# Patient Record
Sex: MALE | Race: WHITE | NOT HISPANIC OR LATINO | URBAN - NONMETROPOLITAN AREA
[De-identification: names, ages, dates, MRNs, and addresses within clinical notes are randomized per-mention and may not be internally consistent; named-entity substitution may affect disease eponyms.]

---

## 2023-04-11 ENCOUNTER — OFFICE VISIT (OUTPATIENT)
Dept: PRIMARY CARE | Facility: CLINIC | Age: 53
End: 2023-04-11
Payer: COMMERCIAL

## 2023-04-11 VITALS
HEART RATE: 95 BPM | HEIGHT: 70 IN | DIASTOLIC BLOOD PRESSURE: 86 MMHG | SYSTOLIC BLOOD PRESSURE: 136 MMHG | OXYGEN SATURATION: 97 % | BODY MASS INDEX: 37.25 KG/M2 | WEIGHT: 260.2 LBS

## 2023-04-11 DIAGNOSIS — J01.01 ACUTE RECURRENT MAXILLARY SINUSITIS: Primary | ICD-10-CM

## 2023-04-11 PROBLEM — R73.9 ELEVATED BLOOD SUGAR: Status: ACTIVE | Noted: 2023-04-11

## 2023-04-11 PROBLEM — I71.21 THORACIC ASCENDING AORTIC ANEURYSM (CMS-HCC): Status: ACTIVE | Noted: 2023-04-11

## 2023-04-11 PROBLEM — R06.09 DYSPNEA ON EXERTION: Status: ACTIVE | Noted: 2023-04-11

## 2023-04-11 PROBLEM — R00.0 TACHYCARDIA: Status: ACTIVE | Noted: 2023-04-11

## 2023-04-11 PROBLEM — E66.812 CLASS 2 OBESITY WITH BODY MASS INDEX (BMI) OF 37.0 TO 37.9 IN ADULT: Status: ACTIVE | Noted: 2023-04-11

## 2023-04-11 PROBLEM — M16.11 PRIMARY OSTEOARTHRITIS OF RIGHT HIP: Status: ACTIVE | Noted: 2023-04-11

## 2023-04-11 PROBLEM — I10 BENIGN ESSENTIAL HYPERTENSION: Status: ACTIVE | Noted: 2023-04-11

## 2023-04-11 PROBLEM — I51.89 DIASTOLIC DYSFUNCTION: Status: ACTIVE | Noted: 2023-04-11

## 2023-04-11 PROBLEM — N52.9 ERECTILE DYSFUNCTION: Status: ACTIVE | Noted: 2023-04-11

## 2023-04-11 PROBLEM — M16.12 PRIMARY OSTEOARTHRITIS OF LEFT HIP: Status: ACTIVE | Noted: 2023-04-11

## 2023-04-11 PROBLEM — E66.9 CLASS 2 OBESITY WITH BODY MASS INDEX (BMI) OF 37.0 TO 37.9 IN ADULT: Status: ACTIVE | Noted: 2023-04-11

## 2023-04-11 PROBLEM — R74.8 ELEVATED LIVER ENZYMES: Status: ACTIVE | Noted: 2023-04-11

## 2023-04-11 PROBLEM — R73.03 PREDIABETES: Status: ACTIVE | Noted: 2023-04-11

## 2023-04-11 PROCEDURE — 3079F DIAST BP 80-89 MM HG: CPT | Performed by: FAMILY MEDICINE

## 2023-04-11 PROCEDURE — 3075F SYST BP GE 130 - 139MM HG: CPT | Performed by: FAMILY MEDICINE

## 2023-04-11 PROCEDURE — 1036F TOBACCO NON-USER: CPT | Performed by: FAMILY MEDICINE

## 2023-04-11 PROCEDURE — 99213 OFFICE O/P EST LOW 20 MIN: CPT | Performed by: FAMILY MEDICINE

## 2023-04-11 RX ORDER — AMOXICILLIN AND CLAVULANATE POTASSIUM 875; 125 MG/1; MG/1
875 TABLET, FILM COATED ORAL 2 TIMES DAILY
Qty: 20 TABLET | Refills: 0 | Status: SHIPPED | OUTPATIENT
Start: 2023-04-11 | End: 2023-04-21

## 2023-04-11 RX ORDER — METOPROLOL SUCCINATE 25 MG/1
25 TABLET, EXTENDED RELEASE ORAL DAILY
COMMUNITY
Start: 2022-02-11 | End: 2023-10-30 | Stop reason: SDUPTHER

## 2023-04-11 ASSESSMENT — ENCOUNTER SYMPTOMS: COUGH: 1

## 2023-04-11 NOTE — PATIENT INSTRUCTIONS
TREATMENT FOR SINUSITIS AND UPPER RESPIRATORY INFECTIONS:     Drink plenty of fluids, especially water.     Used humidifiers, steam, hot liquids to moisten your nasal passages.      Saline nasal spray often helps,  Simply Saline is a nice over the counter saline spray that you can use as much as you want.     IF DIRECTED TO DO SO:    You can use Afrin nasal spray for the first 3 days  ONLY , after that, it will make you worse, not better. DO NOT USE IN CHILDREN UNDER 6 YEARS OF AGE OR IF YOU HAVE ANY HYPERTENTION OR HEART ISSUES.      Mucinex or guaifenesin is an over the counter medication that often helps loosen the mucous.  DO NOT USE IN CHILDREN UNDER 6 YEARS OF AGE.      Please be sure to call or follow-up if you are not better in 5-10 days, or if you are worsening.      The most common cause of upper respiratory infections are viruses - which no not need an antibiotic to get better.   We want your own immune system to fight the virus so you or your child develop immunity to it.    However,  people can develop pneumonia, sinus infections and sometimes ear infections from a virus  - which may need an antibiotic.   So if you are showing signs of breathing issues,  or severe ear pain or facial pain with fevers, of if you are no better after 10 days , its important that you contact us.        If you are prescribed an antibiotic,  it's a good idea to take a probiotic to help prevent diarrhea and yeast infections.  This would be eating a yogurt daily or taking something like acidophillus or Culturelle.

## 2023-04-11 NOTE — PROGRESS NOTES
"Subjective   Patient ID: LUZ Rendon is a 52 y.o. male who presents for Allergic Rhinitis, Sinusitis, and Cough.    Sinusitis  Associated symptoms include coughing.   Cough         Besides the cold - feeling well     Did see cardiology  - all is well  -   CTA in 2 years recommended   Eating better  , working on more exercise   Doing well with Metoprolol       Started a week ago with a scratchy throat for 24 hours - then congestion   Started  Could hear some raspiness in chest -   The past week - changes loose to tight     And now the congestion is changing colors  - dark mucous     Tried albuterol,  and sudafed - no help         Review of Systems   Respiratory:  Positive for cough.        Objective   /86   Pulse 95   Ht 1.778 m (5' 10\")   Wt 118 kg (260 lb 3.2 oz)   SpO2 97%   BMI 37.33 kg/m²     Physical Exam  Vitals reviewed.   Constitutional:       General: He is not in acute distress.     Appearance: Normal appearance.   HENT:      Head: Normocephalic and atraumatic.      Nose: Congestion present.      Mouth/Throat:      Mouth: Mucous membranes are moist.      Pharynx: No posterior oropharyngeal erythema.   Eyes:      Extraocular Movements: Extraocular movements intact.      Conjunctiva/sclera: Conjunctivae normal.      Pupils: Pupils are equal, round, and reactive to light.   Cardiovascular:      Rate and Rhythm: Normal rate and regular rhythm.      Heart sounds: Normal heart sounds. No murmur heard.  Pulmonary:      Effort: Pulmonary effort is normal. No respiratory distress.      Breath sounds: Normal breath sounds. No wheezing.      Comments: Rhonchi cleared with a cough   Musculoskeletal:      Cervical back: Neck supple.   Lymphadenopathy:      Cervical: No cervical adenopathy.   Skin:     General: Skin is warm and dry.      Findings: No rash.   Neurological:      General: No focal deficit present.      Mental Status: He is alert.   Psychiatric:         Mood and Affect: Mood normal.         " Thought Content: Thought content normal.         Assessment/Plan   Problem List Items Addressed This Visit    None  Visit Diagnoses       Acute recurrent maxillary sinusitis    -  Primary    Relevant Medications    amoxicillin-pot clavulanate (Augmentin) 875-125 mg tablet            We discussed at visit any disease processes that were of concern as well as the risks, benefits and instructions of any new medication provided.    See orders and discussion section for information handed to patient on their Clinical Summary.   Patient (and/or caretaker of patient if present)  stated all questions were answered, and they voiced understanding of instructions.

## 2023-10-30 ENCOUNTER — OFFICE VISIT (OUTPATIENT)
Dept: PRIMARY CARE | Facility: CLINIC | Age: 53
End: 2023-10-30
Payer: COMMERCIAL

## 2023-10-30 ENCOUNTER — ANCILLARY PROCEDURE (OUTPATIENT)
Dept: RADIOLOGY | Facility: CLINIC | Age: 53
End: 2023-10-30
Payer: COMMERCIAL

## 2023-10-30 VITALS
BODY MASS INDEX: 35.73 KG/M2 | OXYGEN SATURATION: 98 % | WEIGHT: 249 LBS | DIASTOLIC BLOOD PRESSURE: 78 MMHG | SYSTOLIC BLOOD PRESSURE: 142 MMHG | HEART RATE: 103 BPM

## 2023-10-30 DIAGNOSIS — M25.521 RIGHT ELBOW PAIN: Primary | ICD-10-CM

## 2023-10-30 DIAGNOSIS — I10 BENIGN ESSENTIAL HYPERTENSION: ICD-10-CM

## 2023-10-30 DIAGNOSIS — M25.521 RIGHT ELBOW PAIN: ICD-10-CM

## 2023-10-30 PROCEDURE — 73080 X-RAY EXAM OF ELBOW: CPT | Mod: RT,FY

## 2023-10-30 PROCEDURE — 99213 OFFICE O/P EST LOW 20 MIN: CPT | Performed by: FAMILY MEDICINE

## 2023-10-30 PROCEDURE — 3077F SYST BP >= 140 MM HG: CPT | Performed by: FAMILY MEDICINE

## 2023-10-30 PROCEDURE — 73080 X-RAY EXAM OF ELBOW: CPT | Mod: RIGHT SIDE | Performed by: RADIOLOGY

## 2023-10-30 PROCEDURE — 1036F TOBACCO NON-USER: CPT | Performed by: FAMILY MEDICINE

## 2023-10-30 PROCEDURE — 3078F DIAST BP <80 MM HG: CPT | Performed by: FAMILY MEDICINE

## 2023-10-30 RX ORDER — METOPROLOL SUCCINATE 25 MG/1
25 TABLET, EXTENDED RELEASE ORAL DAILY
Qty: 90 TABLET | Refills: 0 | Status: SHIPPED | OUTPATIENT
Start: 2023-10-30 | End: 2024-02-26 | Stop reason: SDUPTHER

## 2023-10-30 NOTE — PROGRESS NOTES
Subjective   Patient ID: LUZ Rendon is a 52 y.o. male who presents for Arm Pain (Saturday he lifted a 20# bag of ice and felt something pop in his right lower arm.  Went to urgent care this morning and they did nothing but refer him to sports medicine and he can't get in there until Wednesday.).    HPI     Hx per MA above -     Saturday - lifted a 20# bag if ice -   Flet a pop in his right lower arm -   Referred to sports med     Felt a bolt of pain -      Originated near elbow -   Shot into the lower part of the arm  -   Constant pain since that time  -   pain is 7/10    Base of bicep of the right is tender - hard to  things  -     Has appt [facility] with Manuel Browning in [place]     Review of Systems    Objective   /78 (BP Location: Left arm, Patient Position: Sitting, BP Cuff Size: Large adult)   Pulse 103   Wt 113 kg (249 lb)   SpO2 98%   BMI 35.73 kg/m²     Physical Exam  Vitals reviewed.   Constitutional:       Appearance: Normal appearance. He is obese. He is not ill-appearing.   Musculoskeletal:         General: Tenderness (base of biceps) present. No swelling or deformity. Normal range of motion.   Neurological:      Mental Status: He is alert.         Assessment/Plan   Problem List Items Addressed This Visit    None  Visit Diagnoses         Codes    Right elbow pain    -  Primary M25.521    Relevant Orders    XR elbow right T 3+ views        Discussed likely partially torn tendon    RICE tx for now - has appt with ortho     We discussed at visit any disease processes that were of concern as well as the risks, benefits and instructions of any new medication provided.    See orders and discussion section for information handed to patient on their Clinical Summary.   Patient (and/or caretaker of patient if present)  stated all questions were answered, and they voiced understanding of instructions.

## 2023-10-30 NOTE — LETTER
October 30, 2023     Patient: Rodney Rendon   YOB: 1970   Date of Visit: 10/30/2023       To Whom It May Concern:    Rodney Rendon was seen in my clinic on 10/30/2023 at 2:00 pm. Please excuse Rodney for his absence from work on this day to make the appointment.    Please excuse through 11/1/23 due to injury. P-    If you have any questions or concerns, please don't hesitate to call.         Sincerely,         Zahra Ballrad MD        CC: No Recipients

## 2023-10-30 NOTE — PATIENT INSTRUCTIONS
Lets check an xray today  -     No heavy lifting , ice often -   Keep appt [facility]     Can take Aleve or tylenol as needed for pain

## 2023-11-01 ENCOUNTER — OFFICE VISIT (OUTPATIENT)
Dept: SPORTS MEDICINE | Facility: CLINIC | Age: 53
End: 2023-11-01
Payer: COMMERCIAL

## 2023-11-01 VITALS
HEART RATE: 93 BPM | HEIGHT: 70 IN | SYSTOLIC BLOOD PRESSURE: 126 MMHG | WEIGHT: 250 LBS | BODY MASS INDEX: 35.79 KG/M2 | DIASTOLIC BLOOD PRESSURE: 84 MMHG

## 2023-11-01 DIAGNOSIS — M25.521 RIGHT ELBOW PAIN: ICD-10-CM

## 2023-11-01 DIAGNOSIS — S46.201A TRAUMATIC INJURY OF RIGHT BICEPS BRACHII MUSCLE: Primary | ICD-10-CM

## 2023-11-01 DIAGNOSIS — S53.401A ELBOW SPRAIN, RIGHT, INITIAL ENCOUNTER: ICD-10-CM

## 2023-11-01 DIAGNOSIS — M77.01 GOLFER'S ELBOW, RIGHT: ICD-10-CM

## 2023-11-01 DIAGNOSIS — S46.219A BICEPS STRAIN, INITIAL ENCOUNTER: ICD-10-CM

## 2023-11-01 PROCEDURE — 3074F SYST BP LT 130 MM HG: CPT | Performed by: NURSE PRACTITIONER

## 2023-11-01 PROCEDURE — 99214 OFFICE O/P EST MOD 30 MIN: CPT | Performed by: NURSE PRACTITIONER

## 2023-11-01 PROCEDURE — 99204 OFFICE O/P NEW MOD 45 MIN: CPT | Performed by: NURSE PRACTITIONER

## 2023-11-01 PROCEDURE — 3079F DIAST BP 80-89 MM HG: CPT | Performed by: NURSE PRACTITIONER

## 2023-11-01 PROCEDURE — 1036F TOBACCO NON-USER: CPT | Performed by: NURSE PRACTITIONER

## 2023-11-01 ASSESSMENT — ENCOUNTER SYMPTOMS
CONSTITUTIONAL NEGATIVE: 1
CARDIOVASCULAR NEGATIVE: 1
RESPIRATORY NEGATIVE: 1

## 2023-11-01 ASSESSMENT — PAIN DESCRIPTION - DESCRIPTORS: DESCRIPTORS: ACHING;BURNING;SHOOTING;THROBBING

## 2023-11-01 ASSESSMENT — PAIN - FUNCTIONAL ASSESSMENT: PAIN_FUNCTIONAL_ASSESSMENT: 0-10

## 2023-11-01 ASSESSMENT — PAIN SCALES - GENERAL
PAINLEVEL_OUTOF10: 8
PAINLEVEL: 8

## 2023-11-01 NOTE — PROGRESS NOTES
"New patient  History Of Present Illness  LUZ Rendon is a 52 y.o. male presenting with Elbow Pain  Patient complains of right elbow pain. Onset of the symptoms was several days ago. Inciting event: injury occurred while lifting a bag of 20lb ice where he felt a pop and sharp shooting pain from his distal bicep into his forearm . Current symptoms include: pain radiating to the forearm and burning sensation with throbbing achey pain at rest and sharp shooting pain when  . Pain is aggravated by: supination/pronation as when opening doors, any flexion or extension at the elbow and holding up any amount of weight . Symptoms have worsened, beginning 3 days ago. Patient has had no prior elbow problems. Evaluation to date: plain films, which were normal. Treatment to date: ice..     Past Medical History  He has no past medical history on file.    Surgical History  He has a past surgical history that includes Other surgical history (02/11/2022).     Social History  He reports that he has never smoked. He has never used smokeless tobacco. He reports that he does not currently use alcohol. He reports that he does not use drugs.    Family History  Family History   Problem Relation Name Age of Onset    Alzheimer's disease Mother      Heart disease Father          Allergies  Aspirin    Review of Systems  Review of Systems   Constitutional: Negative.    HENT: Negative.     Respiratory: Negative.     Cardiovascular: Negative.         Last Recorded Vitals  /84   Pulse 93   Ht 1.778 m (5' 10\")   Wt 113 kg (250 lb)   BMI 35.87 kg/m²      Examination:  Right Elbow MedioDistal head of Biceps tendon Medial Epicondyle  Erythema: Negative.   Edema: Distal Bicep Medial Elbow   Effusion: Negative.   Warmth: Negative.   Ecchymosis/Bruising: Negative.   Percussion Test: Negative.    Tuning Fork Test: Negative.    Abrasions: Negative.     Orientation: Asymmetrical due to swelling.            ROM:   Positive  FROM but causes pain. "     Muscle Strength:   +5/+5 Triceps (Elbow Extension)          +2/+5 Biceps Brachii (Elbow Flexion)  +2/+5 Brachialis (Elbow Flexion)  +2/+5 Brachioradialis (Elbow Flexion)        +5/+5Coracobrachialis  Positive  +4/+5 Common Flexor Tendon Decreased due to pain and swelling  +5/+5 Common Extensor Tendon  +5/+5 Pronation  +5/+5 Supination.            DTR/Neurological:   Negative , Normal, Sensation Intact, 2-Point Discrimination: Negative  +2/+4 Biceps Reflex (C5)  +2/+4 Brachioradialis Reflex (C6)  +2/+4 Triceps Reflex (C7).            Sensation/Neurological:    Negative, Sensation Intact; 2-Point Discrimination Test: Negative  C5: Area of collarbones, lateral upper arms, and upper chest  C6: Lateral forearms, thumbs, anterior index finger, and lateral half of the middle finger  C7: Some of posterior index finger, medial half of middle finger, upper posterior back, and back of arms  C8: Ring finger, little finger, medial forearm, and posterior upper back  T1: Medial anterior upper arm, axilla, upper chest, and posterior back.            Palpation: Positive  Tenderness to Palpation over the Right Elbow Proximal Medial Epicondyle           Vascular:   +2/+4 Carotid Pulse   +2/+4 Brachial Artery   +2/+4 Radial Pulse   +2/+4 Ulnar Pulse,   Capillary Refill< 2 - seconds.            Elbow - Compression Syndrome:  Elbow Flexion Test: Positive    Tinel Test Ulnar Side: Negative.   Tinel Test Radial Side: Negative.   Pronator Compression Test: Positive    Supinator Compression Test: Positive           Elbow - Instability:  Valgus Stess Test: Negative.   Varus Stress Test: Negative.   Posterolateral Rotary Instability Test: Negative.   Ulnar Nerve Instability Test: Negative.   Milking Maneuver Test: Negative.   Lateral Pivot Shift Test: Negative.         Elbow - Lateral Epicondylitis:  Tennis Elbow Test: Negative.   Cozen Test: Negative.   Chair Test: Negative.   Mercedes Test: Negative.   Mill Test: Negative.    Forearm/Wrist Extension Test: Negative.   Maudsley's/Schulburg's (Middle Finger Extension) Test: Negative.         Elbow - Medial Epicondylitis:  Golfer's Elbow Sign: Positive    Reverse Cozen Test: Positive    Reverse Chair Test: Positive    Wrist Flexion Test: Positive    4th and 5th Finger Flexion Test: Positive      Elbow - Orientation:  Hyperflexion Test: Negative.   Hyperextension Test: Negative.   Supination Stress Test: Negative.   Pronation Stress Test:  Negative.          Imaging and Diagnostics Review:  Radiology  Plain radiographs were ordered and independently reviewed in the presence of the family.    Findings:  Interpreted By:  Hugh Craig,   STUDY:  XR ELBOW RIGHT 3+ VIEWS;  10/30/2023 2:55 pm      INDICATION:  Signs/Symptoms:pain right elbow  - sudden bolt of pain after lifting  ice a few days ago.      COMPARISON:  None.      ACCESSION NUMBER(S):  [id number]      ORDERING CLINICIAN:  PEEWEE RODRIGUEZ      FINDINGS:  Bony structures:  Intact      Joint spaces:  Maintained      Soft tissues:  Unremarkable without significant edema or radiodense  foreign body      Other:  None significant      IMPRESSION:  No acute findings.      MACRO:  None      Signed by: Hugh Craig 10/30/2023 3:33 PM  Dictation workstation:   [id number]    Assessment   1. Traumatic injury of right biceps brachii muscle    2. Right elbow pain    3. Elbow sprain, right, initial encounter    4. Biceps strain, initial encounter    5. Golfer's elbow, right        Plan   Treatment or Intervention:  May use RICE therapy as needed ,   Reviewed handout on lateral and/or medial epicondylitis; a copy of this handout was provided to the patient at the time of this office visit. ,   Start into hand/physical therapy 1-2 times a week for 8-10 weeks with manual therapy as well as dry needling and IASTM ,   Reviewed home exercises to be performed by the patient routinely , Additionally, reviewed modifying activities to be performed  by the patient while exercising and additionally discussed modifying activities to be performed with activities of daily living ,   Went over the following treatments in detail with the patient: ice cup icing massage to be performed for a duration of 9-11 minutes, manual massage over the affected area, as well as tendon and/or ligament stripping using the back of a soup spoon and/or butter knife with massage cream to be performed daily ,   Recommendation to wear counterforce brace with activity and/or when symptoms become aggravated ,   Discussed in detail with the patient to the level of their understanding the possibility in the future of regenerative injections versus corticosteroid injections , Recommendation over-the-counter calcium with vitamin-D 2 -3000+ milligrams a day, as well as OTC symphytum as directed daily to promote bony healing, in addition to a daily multivitamin. ,   Recommendation over-the-counter curcumin, turmeric, boswellia, as well as egg shell membrane as directed to aid with joint inflammation. ,   Recommendation over-the-counter Move Free for joint health. , May take OTC Tylenol Extra Strength or OTC Tylenol Arthritis, taking one every 6-8 hours with food as needed for pain management,   Patient advised regarding the risks and/or potential adverse reactions and/or side effects of any prescribed medications along with any over-the-counter medications or any supplements used. Patient advised to seek immediate medical care if any adverse reactions occur.   The patient and/or patient(s) parent(s) verbalized their understanding ,   MRI of the RIGHT elbow to rule out ligament or tendon injury versus stress fracture versus other , and Follow-up after Right elbow MRI or in 2-4 weeks for a reevaluation or sooner as needed     Diagnostic studies:  MRI Elbow ordered    XR elbow right T 3+ views  Narrative: Interpreted By:  Hugh Craig,   STUDY:  XR ELBOW RIGHT 3+ VIEWS;  10/30/2023 2:55 pm       INDICATION:  Signs/Symptoms:pain right elbow  - sudden bolt of pain after lifting  ice a few days ago.      COMPARISON:  None.      ACCESSION NUMBER(S):  [id number]      ORDERING CLINICIAN:  PEEWEE RODRIGUEZ      FINDINGS:  Bony structures:  Intact      Joint spaces:  Maintained      Soft tissues:  Unremarkable without significant edema or radiodense  foreign body      Other:  None significant      Impression: No acute findings.      MACRO:  None      Signed by: Hugh Craig 10/30/2023 3:33 PM  Dictation workstation:   [id number]       Activity Instructions, Restrictions, and Accommodations:  The family has been provided a note (after visit summary) outlining all current activity instructions, restrictions, and accommodations.    Consultations/Referrals:  Physical therapy    Follow-up:  Clinic follow-up will be arranged by our  after additional study is completed.  No follow-ups on file.    JESSICA MOONEY on 11/1/23 at 12:42 PM.     Jessica Mooney CNP

## 2023-11-01 NOTE — LETTER
November 1, 2023     Patient: Rodney Rendon   YOB: 1970   Date of Visit: 11/1/2023       To Whom It May Concern:    It is my medical opinion that Rodney Rendon may return to light duty immediately with the following restrictions: no lifting more than 5-10lbs .    If you have any questions or concerns, please don't hesitate to call.         Sincerely,        YORDY Medeiros-CNP    CC: No Recipients

## 2023-11-01 NOTE — PATIENT INSTRUCTIONS
May use RICE therapy as needed ,   Reviewed handout on lateral and/or medial epicondylitis; a copy of this handout was provided to the patient at the time of this office visit. ,   Start into hand/physical therapy 1-2 times a week for 8-10 weeks with manual therapy as well as dry needling and IASTM ,   Reviewed home exercises to be performed by the patient routinely , Additionally, reviewed modifying activities to be performed by the patient while exercising and additionally discussed modifying activities to be performed with activities of daily living ,   Went over the following treatments in detail with the patient: ice cup icing massage to be performed for a duration of 9-11 minutes, manual massage over the affected area, as well as tendon and/or ligament stripping using the back of a soup spoon and/or butter knife with massage cream to be performed daily ,   Recommendation to wear counterforce brace with activity and/or when symptoms become aggravated ,   Discussed in detail with the patient to the level of their understanding the possibility in the future of regenerative injections versus corticosteroid injections , Recommendation over-the-counter calcium with vitamin-D 2 -3000+ milligrams a day, as well as OTC symphytum as directed daily to promote bony healing, in addition to a daily multivitamin. ,   Recommendation over-the-counter curcumin, turmeric, boswellia, as well as egg shell membrane as directed to aid with joint inflammation. ,   Recommendation over-the-counter Move Free for joint health. , May take OTC Tylenol Extra Strength or OTC Tylenol Arthritis, taking one every 6-8 hours with food as needed for pain management,   Patient advised regarding the risks and/or potential adverse reactions and/or side effects of any prescribed medications along with any over-the-counter medications or any supplements used. Patient advised to seek immediate medical care if any adverse reactions occur.   The patient  and/or patient(s) parent(s) verbalized their understanding ,   MRI of the RIGHT elbow to rule out ligament or tendon injury versus stress fracture versus other , and Follow-up after Right elbow MRI or in 2-4 weeks for a reevaluation or sooner as needed

## 2023-11-06 ENCOUNTER — EVALUATION (OUTPATIENT)
Dept: OCCUPATIONAL THERAPY | Facility: CLINIC | Age: 53
End: 2023-11-06
Payer: COMMERCIAL

## 2023-11-06 DIAGNOSIS — S46.219A BICEPS STRAIN, INITIAL ENCOUNTER: Primary | ICD-10-CM

## 2023-11-06 PROCEDURE — 97140 MANUAL THERAPY 1/> REGIONS: CPT | Mod: GO

## 2023-11-06 PROCEDURE — 97165 OT EVAL LOW COMPLEX 30 MIN: CPT | Mod: GO

## 2023-11-06 ASSESSMENT — PAIN SCALES - GENERAL: PAINLEVEL_OUTOF10: 3

## 2023-11-06 ASSESSMENT — PAIN - FUNCTIONAL ASSESSMENT: PAIN_FUNCTIONAL_ASSESSMENT: 0-10

## 2023-11-06 NOTE — PROGRESS NOTES
Occupational Therapy    Occupational Therapy Evaluation    Name: LUZ Rendon  MRN: [id number]  : 1970  Date: 23  Time Calculation  Start Time: 0300  Stop Time: 0345  Time Calculation (min): 45 min    Assessment: Pt is a 52 y.o M presenting with R biceps brachii tear that occurred 10/28/23 after picking up a 20lb bag of ice. Pt presents with decreased strength, pain, and functional use of RUE. Pt would benefit from ultrasound, IASTM, taping, and strengthening  to improve deficits for full return to work duties.      Plan:  Outpatient Plan  Treatment Interventions: UE strengthening/ROM, Neuromuscular reeducation  OT Plan: 2 x per week for 4-6 weeks  Frequency: 2 x per week  Duration: 6 weeks  Onset Date: 10/28/23    Subjective   Current Problem:  1. Biceps strain, initial encounter  Follow Up In Occupational Therapy        General:           Precautions  Precautions Comment: No lifting more then 5lbs    Pain Assessment:   3/10 R biceps    Objective   Home Living:  Home Living  Lives With: Spouse  Prior Function Per Pt/Caregiver Report:  Prior Function Per Pt/Caregiver Report  Hand Dominance: Right  IADL History:       Extremities:      Special Tests:   Hook test: negative   Pain with resisted supination     Outcome Measures:  DASH- 40.90    OP EDUCATION:  Education  Individual(s) Educated: Patient  Education Provided: Symptom management, Diagnosis & Precautions, POC discussed and agreed upon  Treatment;  Ultrasound to R distal biceps @ 1.0 w/cm2, continuous, 3 mhz, x 8 min  IASTM to R biceps   Applied K-tape I to O R biceps          Goals:  Active       OT Goals       OT Goal 1       Start:  23    Expected End:  23       Pt will have full strength in R elbow and forearm to return to work duties            OT Problem       PATIENT WILL REPORT PAIN OF 0/10 DEMONSTRATING A REDUCTION OF OVERALL PAIN       Start:  23    Expected End:  23

## 2023-11-06 NOTE — LETTER
November 6, 2023     Patient: Rodney Rendon   YOB: 1970   Date of Visit: 11/6/2023       To Whom It May Concern:    It is my medical opinion that Rodney Rendon {Work release (duty restriction):10207}.    If you have any questions or concerns, please don't hesitate to call.         Sincerely,        Tobias Davis, OT    CC: No Recipients

## 2023-11-06 NOTE — LETTER
November 6, 2023     Patient: Rodney Rendon   YOB: 1970   Date of Visit: 11/6/2023       To Whom it May Concern:    Rodney Rendon was seen in my clinic on 11/6/2023. He {Return to school/sport:84559}.    If you have any questions or concerns, please don't hesitate to call.         Sincerely,          Tobias Davis, OT        CC: No Recipients

## 2023-11-08 ENCOUNTER — TREATMENT (OUTPATIENT)
Dept: OCCUPATIONAL THERAPY | Facility: CLINIC | Age: 53
End: 2023-11-08
Payer: COMMERCIAL

## 2023-11-08 DIAGNOSIS — S46.219A BICEPS STRAIN, INITIAL ENCOUNTER: Primary | ICD-10-CM

## 2023-11-08 PROCEDURE — 97140 MANUAL THERAPY 1/> REGIONS: CPT | Mod: GO

## 2023-11-08 PROCEDURE — 97035 APP MDLTY 1+ULTRASOUND EA 15: CPT | Mod: GO

## 2023-11-08 NOTE — PROGRESS NOTES
"Occupational Therapy    Occupational Therapy Treatment    Name: Rodney Rendon \"W P\"  MRN: [id number]  : 1970  Date: 23  Time Calculation  Start Time:   Stop Time: 8  Time Calculation (min): 33 min    Visit:  2    Subjective \" I am able to  a coffee cup with less pain\"     Current Problem:  1. Biceps strain, initial encounter          Pain Assessment:   2/10 R distal bicep    Objective   Modalities:  Ultrasound to R distal biceps @ 1.0 w/cm2, continuous, 3 mhz, x 8 min    Manual therapy:  IASTM to R biceps  Applied K-tape I to O R biceps    Therapeutic exercise:   Isometric elbow flexion x 10      Assessment:   Pt tolerated manual therapy with low reactivity. Pt reports that he is having the same R distal bicep pain and continues to limit activity with R arm.     Plan: Continue with IASTM, and ultrasound to promote tissue healing and ROM.       Goals:  Active       OT Goals       OT Goal 1       Start:  23    Expected End:  23       Pt will have full strength in R elbow and forearm to return to work duties            OT Problem       PATIENT WILL REPORT PAIN OF 0/10 DEMONSTRATING A REDUCTION OF OVERALL PAIN       Start:  23    Expected End:  23              "

## 2023-11-13 ENCOUNTER — TREATMENT (OUTPATIENT)
Dept: OCCUPATIONAL THERAPY | Facility: CLINIC | Age: 53
End: 2023-11-13
Payer: COMMERCIAL

## 2023-11-13 DIAGNOSIS — S46.219A BICEPS STRAIN, INITIAL ENCOUNTER: Primary | ICD-10-CM

## 2023-11-13 PROCEDURE — 97140 MANUAL THERAPY 1/> REGIONS: CPT | Mod: GO

## 2023-11-13 PROCEDURE — 97110 THERAPEUTIC EXERCISES: CPT | Mod: GO

## 2023-11-13 NOTE — PROGRESS NOTES
"Occupational Therapy    Occupational Therapy Treatment    Name: Rodney Rendon \"W P\"  MRN: [id number]  : 1970  Date: 23       Visit:  3  350    Subjective     Current Problem:  1. Biceps strain, initial encounter          Pain Assessment:    R distal bicep    Objective   Modalities:  Ultrasound to R distal biceps @ 1.0 w/cm2, continuous, 3 mhz, x 8 min    Manual therapy:  IASTM to R biceps  Applied K-tape I to O R biceps    Therapeutic exercise:   Isometric elbow flexion x 10      Assessment:       Plan: Continue with IASTM, and ultrasound to promote tissue healing and ROM.       Goals:  Active       OT Goals       OT Goal 1       Start:  23    Expected End:  23       Pt will have full strength in R elbow and forearm to return to work duties            OT Problem       PATIENT WILL REPORT PAIN OF 0/10 DEMONSTRATING A REDUCTION OF OVERALL PAIN       Start:  23    Expected End:  23            Occupational Therapy    {OT ALL NOTES:[id number]}  "

## 2023-11-13 NOTE — PROGRESS NOTES
"Occupational Therapy    Occupational Therapy Treatment    Name: Rodney Rendon \"W P\"  MRN: [id number]  : 1970  Date: 23  Time Calculation  Start Time: 1550  Stop Time: 1625  Time Calculation (min): 35 min    Visit:  3    Subjective     Current Problem:  1. Biceps strain, initial encounter          Pain Assessment:   6/ 10 R distal bicep    Objective   Modalities:  Ultrasound to R distal biceps @ 1.0 w/cm2, continuous, 3 mhz, x 8 min    Manual therapy:  IASTM to R biceps      Therapeutic exercise:   Isometric elbow flexion 3 x 10   Eccentric loading elbow flexion w/ 2lb weight 3 x 10   Supination/ pronation w/ 3lb weight   Wrist twist 3 x 10     Assessment:   Pt continues to have discomfort in the R distal bicep and decreased supination strength per MMT. Pt tolerated light strengthening to biceps with low reactivity. Pt will begin eccentric loading elbow flexion exercises with red band for HEP.     Plan: Continue with IASTM,  ultrasound, and strengthening as tolerated.       Goals:  Active       OT Goals       OT Goal 1       Start:  23    Expected End:  23       Pt will have full strength in R elbow and forearm to return to work duties            OT Problem       PATIENT WILL REPORT PAIN OF 0/10 DEMONSTRATING A REDUCTION OF OVERALL PAIN       Start:  23    Expected End:  23              "

## 2023-11-15 ENCOUNTER — TREATMENT (OUTPATIENT)
Dept: OCCUPATIONAL THERAPY | Facility: CLINIC | Age: 53
End: 2023-11-15
Payer: COMMERCIAL

## 2023-11-15 DIAGNOSIS — S46.219A BICEPS STRAIN, INITIAL ENCOUNTER: Primary | ICD-10-CM

## 2023-11-15 PROCEDURE — 97140 MANUAL THERAPY 1/> REGIONS: CPT | Mod: GO

## 2023-11-15 PROCEDURE — 97110 THERAPEUTIC EXERCISES: CPT | Mod: GO

## 2023-11-15 NOTE — PROGRESS NOTES
"Occupational Therapy    Occupational Therapy Treatment    Name: Rodney Rendon \"W P\"  MRN: [id number]  : 1970  Date: 11/15/23  Time Calculation  Start Time: 1650  Stop Time: 1725  Time Calculation (min): 35 min    Visit:  4    Subjective \"I can lift a coffee pot with no problem now\"    Current Problem:  1. Biceps strain, initial encounter          Pain Assessment:   6/ 10 R distal bicep    Objective   Modalities:  Ultrasound to R distal biceps @ 1.0 w/cm2, continuous, 3 mhz, x 8 min    Manual therapy:  IASTM to R biceps    Therapeutic exercise:   Isometric elbow flexion 3 x 10   Eccentric loading elbow flexion w/  red band 3 x 10   Supination/ pronation w/ 3lb weight   Wrist twist 3 x 10   Supination w/ red flex bar     Assessment:   Pt tolerated exercises with low reactivity. Pt  continues to have discomfort in the R distal bicep. Pt reports improved strength with doing activities at home.     Plan: Continue with IASTM,  ultrasound, and strengthening as tolerated.       Goals:  Active       OT Goals       OT Goal 1       Start:  23    Expected End:  23       Pt will have full strength in R elbow and forearm to return to work duties            OT Problem       PATIENT WILL REPORT PAIN OF 0/10 DEMONSTRATING A REDUCTION OF OVERALL PAIN       Start:  23    Expected End:  23              "

## 2023-11-16 ENCOUNTER — ANCILLARY PROCEDURE (OUTPATIENT)
Dept: RADIOLOGY | Facility: CLINIC | Age: 53
End: 2023-11-16
Payer: COMMERCIAL

## 2023-11-16 PROCEDURE — 73221 MRI JOINT UPR EXTREM W/O DYE: CPT | Mod: RT

## 2023-11-16 PROCEDURE — 73221 MRI JOINT UPR EXTREM W/O DYE: CPT | Mod: RIGHT SIDE | Performed by: STUDENT IN AN ORGANIZED HEALTH CARE EDUCATION/TRAINING PROGRAM

## 2023-11-17 PROBLEM — S53.401D ELBOW SPRAIN, RIGHT, SUBSEQUENT ENCOUNTER: Status: ACTIVE | Noted: 2023-11-17

## 2023-11-17 PROBLEM — M67.88 BICEPS TENDINOSIS OF RIGHT UPPER EXTREMITY: Status: ACTIVE | Noted: 2023-11-17

## 2023-11-17 PROBLEM — S46.211D: Status: ACTIVE | Noted: 2023-11-17

## 2023-11-17 PROBLEM — S46.211A TEAR OF RIGHT BICEPS MUSCLE: Status: ACTIVE | Noted: 2023-11-17

## 2023-11-17 ASSESSMENT — ENCOUNTER SYMPTOMS
CARDIOVASCULAR NEGATIVE: 1
CONSTITUTIONAL NEGATIVE: 1
RESPIRATORY NEGATIVE: 1

## 2023-11-17 NOTE — PATIENT INSTRUCTIONS
May use RICE therapy as needed    Continue hand/physical therapy 1-2 times a week for 8-10 weeks with manual therapy as well as dry needling and IASTM ,   Discussed in detail with the patient to the level of their understanding the possibility in the future of regenerative injections versus corticosteroid injections , Recommendation over-the-counter calcium with vitamin-D 2 -3000+ milligrams a day, as well as OTC symphytum as directed daily to promote bony healing, in addition to a daily multivitamin. ,   Recommendation over-the-counter Move Free for joint health. ,   May take OTC Tylenol Extra Strength or OTC Tylenol Arthritis, taking one every 6-8 hours with food as needed for pain management,   Patient advised regarding the risks and/or potential adverse reactions and/or side effects of any prescribed medications along with any over-the-counter medications or any supplements used. Patient advised to seek immediate medical care if any adverse reactions occur. The patient and/or patient(s) parent(s) verbalized their understanding ,   Reviewed RIGHT ELBOW MRI in detail with the patient and/or patients parent/legal guardian to their level of understanding; a copy of these results were provided to the patient and/or patients parent/legal guardian at the time of this office visit. Discussed treatment options with the patient and/or patients parent/legal guardian in detail to the level of their understanding. The patient and/or patients parent/legal guardian verbalized their understanding and agreement to the treatment plan at the time of this office visit.    Referral to Dr. Nickerson for surgical consult. Follow up in injury clinic as needed.

## 2023-11-17 NOTE — PROGRESS NOTES
"Established patient  History Of Present Illness  Rodney Rendon \"W P\" is a 52 y.o. male presenting for his MRI follow up of his RIGHT elbow/bicep. Patient states he feels like the arm is getting stronger, but relays he is not lifting more than the weight of a gallon of milk. Patient reports he has been in physical therapy 2 x/week which is improving his strength, but has been avoiding overstraining the RIGHT biceps until after the MRI.  Patient reported experiencing pins and needles in his RIGHT arm approximately a week ago, but nothing since, and notes a specific point of tenderness to palpation in the RIGHT biceps muscle belly. 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  We reviewed patient MRI results in detail.  Patient verbalizes understanding of results.  We discussed treatment options and will refer to Dr. Nickerson for surgical evaluation.  Patient verbalizes understanding and agreement with plan of care.  Patient can follow-up as needed for further evaluation or worsening condition.    Past Medical History  He has no past medical history on file.    Surgical History  He has a past surgical history that includes Other surgical history (02/11/2022).     Social History  He reports that he has never smoked. He has never used smokeless tobacco. He reports that he does not currently use alcohol. He reports that he does not use drugs.    Family History  Family History   Problem Relation Name Age of Onset    Alzheimer's disease Mother      Heart disease Father          Allergies  Aspirin    Review of Systems  Review of Systems   Constitutional: Negative.    Respiratory: Negative.     Cardiovascular: Negative.    All other systems reviewed and are negative.       Last Recorded Vitals  /72   Pulse 95   Ht 1.778 m (5' 10\")   Wt 102 kg (225 lb)   BMI 32.28 kg/m²      Examination:  Right Elbow MedioDistal head of Biceps tendon Medial " Epicondyle  Erythema: Negative.   Edema: Distal Bicep Medial Elbow   Effusion: Negative.   Warmth: Negative.   Ecchymosis/Bruising: Negative.   Percussion Test: Negative.    Tuning Fork Test: Negative.    Abrasions: Negative.      Orientation: Asymmetrical due to swelling.            ROM:   Positive  FROM but causes pain.      Muscle Strength:   +5/+5 Triceps (Elbow Extension)          +2/+5 Biceps Brachii (Elbow Flexion)  +2/+5 Brachialis (Elbow Flexion)  +2/+5 Brachioradialis (Elbow Flexion)        +5/+5Coracobrachialis  Positive  +4/+5 Common Flexor Tendon Decreased due to pain and swelling  +5/+5 Common Extensor Tendon  +5/+5 Pronation  +5/+5 Supination.            DTR/Neurological:   Negative , Normal, Sensation Intact, 2-Point Discrimination: Negative  +2/+4 Biceps Reflex (C5)  +2/+4 Brachioradialis Reflex (C6)  +2/+4 Triceps Reflex (C7).            Sensation/Neurological:    Negative, Sensation Intact; 2-Point Discrimination Test: Negative  C5: Area of collarbones, lateral upper arms, and upper chest  C6: Lateral forearms, thumbs, anterior index finger, and lateral half of the middle finger  C7: Some of posterior index finger, medial half of middle finger, upper posterior back, and back of arms  C8: Ring finger, little finger, medial forearm, and posterior upper back  T1: Medial anterior upper arm, axilla, upper chest, and posterior back.            Palpation: Positive  Tenderness to Palpation over the Right Elbow Proximal Medial Epicondyle           Vascular:   +2/+4 Carotid Pulse   +2/+4 Brachial Artery   +2/+4 Radial Pulse   +2/+4 Ulnar Pulse,   Capillary Refill< 2 - seconds.            Elbow - Compression Syndrome:  Elbow Flexion Test: Positive    Tinel Test Ulnar Side: Negative.   Tinel Test Radial Side: Negative.   Pronator Compression Test: Positive    Supinator Compression Test: Positive           Elbow - Instability:  Valgus Stess Test: Negative.   Varus Stress Test: Negative.   Posterolateral Rotary  Instability Test: Negative.   Ulnar Nerve Instability Test: Negative.   Milking Maneuver Test: Negative.   Lateral Pivot Shift Test: Negative.          Elbow - Lateral Epicondylitis:  Tennis Elbow Test: Negative.   Cozen Test: Negative.   Chair Test: Negative.   Mercedes Test: Negative.   Mill Test: Negative.   Forearm/Wrist Extension Test: Negative.   Maudsley's/Schulburg's (Middle Finger Extension) Test: Negative.          Elbow - Medial Epicondylitis:  Golfer's Elbow Sign: Positive    Reverse Cozen Test: Positive    Reverse Chair Test: Positive    Wrist Flexion Test: Positive    4th and 5th Finger Flexion Test: Positive       Elbow - Orientation:  Hyperflexion Test: Negative.   Hyperextension Test: Negative.   Supination Stress Test: Negative.   Pronation Stress Test:  Negative.    Imaging and Diagnostics Review:  Radiology  STUDY:  MRI of the  right elbow  without IV contrast;  11/16/2023 3:10 pm      INDICATION:  Signs/Symptoms:possible tear distal head of biceps.      COMPARISON:  10/30/2023      ACCESSION NUMBER(S):  [id number]      ORDERING CLINICIAN:  JESSICA MOONEY      TECHNIQUE:  MR imaging of the  right elbow was obtained  without IV contrast.      FINDINGS:  TENDONS:  There is tearing involving a large majority of the distal biceps  tendon insertion with a single thin fibers remaining intact. The  tendon is retracted by approximately 2 cm. This is superimposed upon  mild to moderate tendinosis. There is adjacent soft tissue edema.  There is additional common extensor origin tendinosis with low grade  intrasubstance tearing. The remainder of the tendons of the elbow are  in tact.      LIGAMENTS:  The major ligaments of the elbow are intact, including the ulnar  collateral, lateral ulnar collateral, and radial collateral ligaments.      JOINTS:  There is no dislocation. The articular cartilage is intact. There is  no osteochondral defect.      There is no joint effusion. There is no olecranon  bursitis.      OSSEOUS STRUCTURES:  The bone marrow signal is normal. There is no fracture or contusion.  There is no marrow replacing lesion.      SOFT TISSUES:  Musculature is normal without tear or atrophy.      The ulnar nerve is normal. The cubital tunnel is intact.      IMPRESSION:  Full-thickness tear involving essentially the entirety of the biceps  tendon insertion with retraction by 2 cm and mild tendinosis. There  is adjacent soft tissue edema.      Mild common extensor origin tendinosis with partial-thickness  intrasubstance tearing.      MACRO:  None      Signed by: Faisal Mancilla 11/16/2023 5:43 PM    Assessment   1. Right elbow pain    2. Traumatic injury of right biceps brachii muscle    3. Golfer's elbow, right    4. Elbow sprain, right, subsequent encounter    5. Sprain, bicep, right, subsequent encounter    6. Tear of right biceps muscle, subsequent encounter    7. Biceps tendinosis of right upper extremity        Plan   Treatment or Intervention:  May use RICE therapy as needed    Continue hand/physical therapy 1-2 times a week for 8-10 weeks with manual therapy as well as dry needling and IASTM ,   Discussed in detail with the patient to the level of their understanding the possibility in the future of regenerative injections versus corticosteroid injections , Recommendation over-the-counter calcium with vitamin-D 2 -3000+ milligrams a day, as well as OTC symphytum as directed daily to promote bony healing, in addition to a daily multivitamin. ,   Recommendation over-the-counter Move Free for joint health. ,   May take OTC Tylenol Extra Strength or OTC Tylenol Arthritis, taking one every 6-8 hours with food as needed for pain management,   Patient advised regarding the risks and/or potential adverse reactions and/or side effects of any prescribed medications along with any over-the-counter medications or any supplements used. Patient advised to seek immediate medical care if any adverse reactions  occur. The patient and/or patient(s) parent(s) verbalized their understanding ,   Reviewed RIGHT ELBOW MRI in detail with the patient and/or patients parent/legal guardian to         their level of understanding; a copy of these results were provided to the patient and/or patients          parent/legal guardian at the time of this office visit. Discussed treatment options with the          patient and/or patients parent/legal guardian in detail to the level of their understanding. The          patient and/or patients parent/legal guardian verbalized their understanding and agreement to          the treatment plan at the time of this office visit.    8. Referral to Dr. Nickerson for surgical consult.  9. Follow up as needed.    Diagnostic studies:    MR elbow right wo IV contrast  Narrative: Interpreted By:  Faisal Mancilla,   STUDY:  MRI of the  right elbow  without IV contrast;  11/16/2023 3:10 pm      INDICATION:  Signs/Symptoms:possible tear distal head of biceps.      COMPARISON:  10/30/2023      ACCESSION NUMBER(S):  [id number]      ORDERING CLINICIAN:  JESSICA MOONEY      TECHNIQUE:  MR imaging of the  right elbow was obtained  without IV contrast.      FINDINGS:  TENDONS:  There is tearing involving a large majority of the distal biceps  tendon insertion with a single thin fibers remaining intact. The  tendon is retracted by approximately 2 cm. This is superimposed upon  mild to moderate tendinosis. There is adjacent soft tissue edema.  There is additional common extensor origin tendinosis with low grade  intrasubstance tearing. The remainder of the tendons of the elbow are  in tact.      LIGAMENTS:  The major ligaments of the elbow are intact, including the ulnar  collateral, lateral ulnar collateral, and radial collateral ligaments.      JOINTS:  There is no dislocation. The articular cartilage is intact. There is  no osteochondral defect.      There is no joint effusion. There is no olecranon bursitis.       OSSEOUS STRUCTURES:  The bone marrow signal is normal. There is no fracture or contusion.  There is no marrow replacing lesion.      SOFT TISSUES:  Musculature is normal without tear or atrophy.      The ulnar nerve is normal. The cubital tunnel is intact.      Impression: Full-thickness tear involving essentially the entirety of the biceps  tendon insertion with retraction by 2 cm and mild tendinosis. There  is adjacent soft tissue edema.      Mild common extensor origin tendinosis with partial-thickness  intrasubstance tearing.      MACRO:  None      Signed by: Faisal Mancilla 11/16/2023 5:43 PM  Dictation workstation:   [id number]       Activity Instructions, Restrictions, and Accommodations:      Consultations/Referrals:  Physical therapy    Follow-up:  With Dr. Nickerson for a surgical consult and in injury clinic as needed.     SAKINA CARMONA on 11/20/23 at 4:59 PM.     Chavez Browning CNP

## 2023-11-20 ENCOUNTER — OFFICE VISIT (OUTPATIENT)
Dept: SPORTS MEDICINE | Facility: CLINIC | Age: 53
End: 2023-11-20
Payer: COMMERCIAL

## 2023-11-20 ENCOUNTER — APPOINTMENT (OUTPATIENT)
Dept: OCCUPATIONAL THERAPY | Facility: CLINIC | Age: 53
End: 2023-11-20
Payer: COMMERCIAL

## 2023-11-20 VITALS
HEART RATE: 95 BPM | HEIGHT: 70 IN | BODY MASS INDEX: 32.21 KG/M2 | WEIGHT: 225 LBS | SYSTOLIC BLOOD PRESSURE: 112 MMHG | DIASTOLIC BLOOD PRESSURE: 72 MMHG

## 2023-11-20 DIAGNOSIS — M77.01 GOLFER'S ELBOW, RIGHT: ICD-10-CM

## 2023-11-20 DIAGNOSIS — M67.88 BICEPS TENDINOSIS OF RIGHT UPPER EXTREMITY: ICD-10-CM

## 2023-11-20 DIAGNOSIS — S46.211D SPRAIN, BICEP, RIGHT, SUBSEQUENT ENCOUNTER: ICD-10-CM

## 2023-11-20 DIAGNOSIS — S46.211D TEAR OF RIGHT BICEPS MUSCLE, SUBSEQUENT ENCOUNTER: ICD-10-CM

## 2023-11-20 DIAGNOSIS — S53.401D ELBOW SPRAIN, RIGHT, SUBSEQUENT ENCOUNTER: ICD-10-CM

## 2023-11-20 DIAGNOSIS — S46.201A TRAUMATIC INJURY OF RIGHT BICEPS BRACHII MUSCLE: ICD-10-CM

## 2023-11-20 DIAGNOSIS — M25.521 RIGHT ELBOW PAIN: ICD-10-CM

## 2023-11-20 PROCEDURE — 99214 OFFICE O/P EST MOD 30 MIN: CPT | Performed by: NURSE PRACTITIONER

## 2023-11-20 PROCEDURE — 1036F TOBACCO NON-USER: CPT | Performed by: NURSE PRACTITIONER

## 2023-11-20 PROCEDURE — 3078F DIAST BP <80 MM HG: CPT | Performed by: NURSE PRACTITIONER

## 2023-11-20 PROCEDURE — 3074F SYST BP LT 130 MM HG: CPT | Performed by: NURSE PRACTITIONER

## 2023-11-20 ASSESSMENT — PAIN SCALES - GENERAL
PAINLEVEL_OUTOF10: 4
PAINLEVEL: 4

## 2023-11-20 ASSESSMENT — PAIN - FUNCTIONAL ASSESSMENT: PAIN_FUNCTIONAL_ASSESSMENT: 0-10

## 2023-11-20 ASSESSMENT — PAIN DESCRIPTION - DESCRIPTORS: DESCRIPTORS: DISCOMFORT;OTHER (COMMENT)

## 2023-11-20 NOTE — LETTER
November 21, 2023     Patient: Rodney Rendon   YOB: 1970   Date of Visit: 11/20/2023       To Whom It May Concern:    It is my medical opinion that Rodney Rendon may return to light duty immediately with the following restrictions: No use of the RIGHT arm until cleared .    If you have any questions or concerns, please don't hesitate to call.         Sincerely,        YORDY Medeiros-CNP    CC: No Recipients

## 2023-11-22 ENCOUNTER — APPOINTMENT (OUTPATIENT)
Dept: OCCUPATIONAL THERAPY | Facility: CLINIC | Age: 53
End: 2023-11-22
Payer: COMMERCIAL

## 2023-11-27 ENCOUNTER — OFFICE VISIT (OUTPATIENT)
Dept: ORTHOPEDIC SURGERY | Facility: CLINIC | Age: 53
End: 2023-11-27
Payer: COMMERCIAL

## 2023-11-27 DIAGNOSIS — M67.88 BICEPS TENDINOSIS OF RIGHT UPPER EXTREMITY: ICD-10-CM

## 2023-11-27 DIAGNOSIS — S53.401D ELBOW SPRAIN, RIGHT, SUBSEQUENT ENCOUNTER: ICD-10-CM

## 2023-11-27 DIAGNOSIS — M77.01 GOLFER'S ELBOW, RIGHT: ICD-10-CM

## 2023-11-27 DIAGNOSIS — S46.211D TEAR OF RIGHT BICEPS MUSCLE, SUBSEQUENT ENCOUNTER: ICD-10-CM

## 2023-11-27 DIAGNOSIS — S46.211S BICEPS RUPTURE, DISTAL, RIGHT, SEQUELA: Primary | ICD-10-CM

## 2023-11-27 DIAGNOSIS — S46.211D SPRAIN, BICEP, RIGHT, SUBSEQUENT ENCOUNTER: ICD-10-CM

## 2023-11-27 DIAGNOSIS — S46.201A TRAUMATIC INJURY OF RIGHT BICEPS BRACHII MUSCLE: ICD-10-CM

## 2023-11-27 DIAGNOSIS — M25.521 RIGHT ELBOW PAIN: ICD-10-CM

## 2023-11-27 PROCEDURE — 99204 OFFICE O/P NEW MOD 45 MIN: CPT | Performed by: STUDENT IN AN ORGANIZED HEALTH CARE EDUCATION/TRAINING PROGRAM

## 2023-11-27 PROCEDURE — 1036F TOBACCO NON-USER: CPT | Performed by: STUDENT IN AN ORGANIZED HEALTH CARE EDUCATION/TRAINING PROGRAM

## 2023-11-27 NOTE — H&P (VIEW-ONLY)
Rodney Rendon is a 52 year old RHD male presenting for evaluation of distal biceps tendon rupture. They were referred by YORDY Frias. Patient was lifting a 20lb bag of ice with his right arm 3 weeks ago when he heard a snap and felt immediate severe pain in his distal forearm shooting to his shoulder. He was seen by sports medicine and was started on formal PT with an MRI. Imaging showed a full thickness distal biceps tear and he was referred to orthopedic sports medicine for possible surgical intervention. He has been to 4-6 sessions of PT total with minimal improvement. Pain has continued to worsen and is 6-7/10 at rest. Has not taken any medications.    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  Constitutional: None  Eyes: No additions to above history  Ears, Nose, Throat: No additions to above history  Cardiovascular: No additions to above history  Respiratory: No additions to above history  GI: No additions to above history  : No additions to above history  Skin/Neuro: No additions to above history  Endocrine/Heme/Lymph: No additions to above history  Immunologic: No additions to above history  Psychiatric: No additions to above history  Musculoskeletal: see above  Physical Exam  GEN: Alert and Oriented x 3  Constitutional: Well appearing, in no apparent distress.  Eyes: sclera anicteric  ENT: hearing appropriate for normal conversation, neck appears symmetric with no gross thyromegaly  Pulm: No labored breathing, no wheezing  CVS: Regular rate and rhythm  PSY: normal mood and affect  Skin: No rashes, erythema, or induration around shoulder     Focused Musculoskeletal Exam:     Side: right elbow  PROM:   FE [145]  AROM:   FE [145]    Palpable distal  "biceps tendon, negative hook   Pain with resisted pronation, supination, elbow flexion  4/5 strength with elbow flexion, supination    Sensation intact Ax/median/ulnar/radial distributions  Motor intact Ax/median/radial/ulnar/AIN/PIN    Xrays and MRI independently reviewed and interpreted: no fracture. There is a full thickness distal biceps tendon tear with approximately 2 cm of retraction.     I discussed with \"WP\" diagnosis of complete distal biceps tendon rupture without significant retraction likely due to a intact lacertus.  I discussed the possibility of surgical repair of the distal biceps tendon to limit functional deficits from weakness of supination and flexion.  He has seen no improvement over the last 4 weeks or so I discussed that if he would like to fix this we would need to do this in the next week.  We also discussed nonsurgical treatment. Discussed the potential risks of surgery including but not limited to the risk of anesthesia, bleeding, infection, injury to surrounding tissues nerves vessels in particular the LABC/radial/PIN nerves, stiffness, heterotopic ossification, and the potential need for further surgery. Discussed timeline away from full duty at work. All questions answered he is in agreement with this plan.   "

## 2023-11-27 NOTE — LETTER
November 27, 2023     Chavez Browning, APRN-CNP  [address]  [place] [address]    Patient: LUZ Rendon   YOB: 1970   Date of Visit: 11/27/2023       Dear Dr. Chavez Browning, APRN-CNP:    Thank you for referring LUZ Rendon to me for evaluation. Below are my notes for this consultation.  If you have questions, please do not hesitate to call me. I look forward to following your patient along with you.       Sincerely,     Jasmeet Nickerson MD      CC: No Recipients  ______________________________________________________________________________________    Rodney Rendon is a 52 year old RHD male presenting for evaluation of distal biceps tendon rupture. They were referred by YORDY Frias. Patient was lifting a 20lb bag of ice with his right arm 3 weeks ago when he heard a snap and felt immediate severe pain in his distal forearm shooting to his shoulder. He was seen by sports medicine and was started on formal PT with an MRI. Imaging showed a full thickness distal biceps tear and he was referred to orthopedic sports medicine for possible surgical intervention. He has been to 4-6 sessions of PT total with minimal improvement. Pain has continued to worsen and is 6-7/10 at rest. Has not taken any medications.    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  Constitutional: None  Eyes: No additions to above history  Ears, Nose, Throat: No additions to above history  Cardiovascular: No additions to above history  Respiratory: No additions to above history  GI: No additions to above history  : No additions to above history  Skin/Neuro: No additions to above history  Endocrine/Heme/Lymph: No additions to above history  Immunologic: No additions to  "above history  Psychiatric: No additions to above history  Musculoskeletal: see above  Physical Exam  GEN: Alert and Oriented x 3  Constitutional: Well appearing, in no apparent distress.  Eyes: sclera anicteric  ENT: hearing appropriate for normal conversation, neck appears symmetric with no gross thyromegaly  Pulm: No labored breathing, no wheezing  CVS: Regular rate and rhythm  PSY: normal mood and affect  Skin: No rashes, erythema, or induration around shoulder     Focused Musculoskeletal Exam:     Side: right elbow  PROM:   FE [145]  AROM:   FE [145]    Palpable distal biceps tendon, negative hook   Pain with resisted pronation, supination, elbow flexion  4/5 strength with elbow flexion, supination    Sensation intact Ax/median/ulnar/radial distributions  Motor intact Ax/median/radial/ulnar/AIN/PIN    Xrays and MRI independently reviewed and interpreted: no fracture. There is a full thickness distal biceps tendon tear with approximately 2 cm of retraction.     I discussed with \"WP\" diagnosis of complete distal biceps tendon rupture without significant retraction likely due to a intact lacertus.  I discussed the possibility of surgical repair of the distal biceps tendon to limit functional deficits from weakness of supination and flexion.  He has seen no improvement over the last 4 weeks or so I discussed that if he would like to fix this we would need to do this in the next week.  We also discussed nonsurgical treatment. Discussed the potential risks of surgery including but not limited to the risk of anesthesia, bleeding, infection, injury to surrounding tissues nerves vessels in particular the LABC/radial/PIN nerves, stiffness, heterotopic ossification, and the potential need for further surgery. Discussed timeline away from full duty at work. All questions answered he is in agreement with this plan.     "

## 2023-11-30 ENCOUNTER — ANESTHESIA EVENT (OUTPATIENT)
Dept: OPERATING ROOM | Facility: CLINIC | Age: 53
End: 2023-11-30
Payer: COMMERCIAL

## 2023-12-07 ENCOUNTER — ANESTHESIA (OUTPATIENT)
Dept: OPERATING ROOM | Facility: CLINIC | Age: 53
End: 2023-12-07
Payer: COMMERCIAL

## 2023-12-07 ENCOUNTER — ANCILLARY PROCEDURE (OUTPATIENT)
Dept: RADIOLOGY | Facility: CLINIC | Age: 53
End: 2023-12-07
Payer: COMMERCIAL

## 2023-12-07 ENCOUNTER — HOSPITAL ENCOUNTER (OUTPATIENT)
Facility: CLINIC | Age: 53
Setting detail: OUTPATIENT SURGERY
Discharge: HOME | End: 2023-12-07
Attending: STUDENT IN AN ORGANIZED HEALTH CARE EDUCATION/TRAINING PROGRAM | Admitting: STUDENT IN AN ORGANIZED HEALTH CARE EDUCATION/TRAINING PROGRAM
Payer: COMMERCIAL

## 2023-12-07 VITALS
HEART RATE: 84 BPM | OXYGEN SATURATION: 92 % | WEIGHT: 252.65 LBS | RESPIRATION RATE: 16 BRPM | HEIGHT: 70 IN | TEMPERATURE: 97 F | DIASTOLIC BLOOD PRESSURE: 69 MMHG | SYSTOLIC BLOOD PRESSURE: 128 MMHG | BODY MASS INDEX: 36.17 KG/M2

## 2023-12-07 DIAGNOSIS — S46.211S STRAIN OF MUSCLE, FASCIA AND TENDON OF OTHER PARTS OF BICEPS, RIGHT ARM, SEQUELA: ICD-10-CM

## 2023-12-07 DIAGNOSIS — S46.211S BICEPS RUPTURE, DISTAL, RIGHT, SEQUELA: Primary | ICD-10-CM

## 2023-12-07 DIAGNOSIS — M86.9 OSTEOMYELITIS OF FOURTH TOE OF LEFT FOOT (MULTI): ICD-10-CM

## 2023-12-07 PROCEDURE — A24342 PR REINSERT BI/TRICEPS TENDON,DISTAL: Performed by: ANESTHESIOLOGY

## 2023-12-07 PROCEDURE — 3700000002 HC GENERAL ANESTHESIA TIME - EACH INCREMENTAL 1 MINUTE: Performed by: STUDENT IN AN ORGANIZED HEALTH CARE EDUCATION/TRAINING PROGRAM

## 2023-12-07 PROCEDURE — 3700000001 HC GENERAL ANESTHESIA TIME - INITIAL BASE CHARGE: Performed by: STUDENT IN AN ORGANIZED HEALTH CARE EDUCATION/TRAINING PROGRAM

## 2023-12-07 PROCEDURE — 7100000010 HC PHASE TWO TIME - EACH INCREMENTAL 1 MINUTE: Performed by: STUDENT IN AN ORGANIZED HEALTH CARE EDUCATION/TRAINING PROGRAM

## 2023-12-07 PROCEDURE — 7100000009 HC PHASE TWO TIME - INITIAL BASE CHARGE: Performed by: STUDENT IN AN ORGANIZED HEALTH CARE EDUCATION/TRAINING PROGRAM

## 2023-12-07 PROCEDURE — C1713 ANCHOR/SCREW BN/BN,TIS/BN: HCPCS | Performed by: STUDENT IN AN ORGANIZED HEALTH CARE EDUCATION/TRAINING PROGRAM

## 2023-12-07 PROCEDURE — 2720000007 HC OR 272 NO HCPCS: Performed by: STUDENT IN AN ORGANIZED HEALTH CARE EDUCATION/TRAINING PROGRAM

## 2023-12-07 PROCEDURE — 24342 REPAIR OF RUPTURED TENDON: CPT | Performed by: STUDENT IN AN ORGANIZED HEALTH CARE EDUCATION/TRAINING PROGRAM

## 2023-12-07 PROCEDURE — 3600000003 HC OR TIME - INITIAL BASE CHARGE - PROCEDURE LEVEL THREE: Performed by: STUDENT IN AN ORGANIZED HEALTH CARE EDUCATION/TRAINING PROGRAM

## 2023-12-07 PROCEDURE — 2500000004 HC RX 250 GENERAL PHARMACY W/ HCPCS (ALT 636 FOR OP/ED)

## 2023-12-07 PROCEDURE — 2780000003 HC OR 278 NO HCPCS: Performed by: STUDENT IN AN ORGANIZED HEALTH CARE EDUCATION/TRAINING PROGRAM

## 2023-12-07 PROCEDURE — A24342 PR REINSERT BI/TRICEPS TENDON,DISTAL

## 2023-12-07 PROCEDURE — 64415 NJX AA&/STRD BRCH PLXS IMG: CPT | Performed by: ANESTHESIOLOGY

## 2023-12-07 PROCEDURE — 76000 FLUOROSCOPY <1 HR PHYS/QHP: CPT

## 2023-12-07 PROCEDURE — 7100000002 HC RECOVERY ROOM TIME - EACH INCREMENTAL 1 MINUTE: Performed by: STUDENT IN AN ORGANIZED HEALTH CARE EDUCATION/TRAINING PROGRAM

## 2023-12-07 PROCEDURE — 7100000001 HC RECOVERY ROOM TIME - INITIAL BASE CHARGE: Performed by: STUDENT IN AN ORGANIZED HEALTH CARE EDUCATION/TRAINING PROGRAM

## 2023-12-07 PROCEDURE — 3600000008 HC OR TIME - EACH INCREMENTAL 1 MINUTE - PROCEDURE LEVEL THREE: Performed by: STUDENT IN AN ORGANIZED HEALTH CARE EDUCATION/TRAINING PROGRAM

## 2023-12-07 DEVICE — IMPLANTABLE DEVICE: Type: IMPLANTABLE DEVICE | Site: ARM | Status: FUNCTIONAL

## 2023-12-07 RX ORDER — METHYLPREDNISOLONE ACETATE 40 MG/ML
40 INJECTION, SUSPENSION INTRA-ARTICULAR; INTRALESIONAL; INTRAMUSCULAR; SOFT TISSUE ONCE
Status: SHIPPED | OUTPATIENT
Start: 2023-12-07

## 2023-12-07 RX ORDER — MIDAZOLAM HYDROCHLORIDE 1 MG/ML
1 INJECTION, SOLUTION INTRAMUSCULAR; INTRAVENOUS ONCE AS NEEDED
Status: DISCONTINUED | OUTPATIENT
Start: 2023-12-07 | End: 2023-12-07 | Stop reason: HOSPADM

## 2023-12-07 RX ORDER — ROPIVACAINE HYDROCHLORIDE 5 MG/ML
20 INJECTION, SOLUTION EPIDURAL; INFILTRATION; PERINEURAL ONCE
Status: CANCELLED | OUTPATIENT
Start: 2023-12-07 | End: 2023-12-07

## 2023-12-07 RX ORDER — MIDAZOLAM HYDROCHLORIDE 1 MG/ML
INJECTION, SOLUTION INTRAMUSCULAR; INTRAVENOUS AS NEEDED
Status: DISCONTINUED | OUTPATIENT
Start: 2023-12-07 | End: 2023-12-07

## 2023-12-07 RX ORDER — DOCUSATE SODIUM 100 MG/1
100 CAPSULE, LIQUID FILLED ORAL 2 TIMES DAILY PRN
Qty: 14 CAPSULE | Refills: 0 | Status: SHIPPED | OUTPATIENT
Start: 2023-12-07 | End: 2024-01-19 | Stop reason: ALTCHOICE

## 2023-12-07 RX ORDER — OXYCODONE AND ACETAMINOPHEN 5; 325 MG/1; MG/1
1 TABLET ORAL EVERY 6 HOURS PRN
Qty: 20 TABLET | Refills: 0 | Status: SHIPPED | OUTPATIENT
Start: 2023-12-07 | End: 2024-01-19 | Stop reason: ALTCHOICE

## 2023-12-07 RX ORDER — GLYCOPYRROLATE 0.2 MG/ML
INJECTION INTRAMUSCULAR; INTRAVENOUS AS NEEDED
Status: DISCONTINUED | OUTPATIENT
Start: 2023-12-07 | End: 2023-12-07

## 2023-12-07 RX ORDER — METOCLOPRAMIDE HYDROCHLORIDE 5 MG/ML
10 INJECTION INTRAMUSCULAR; INTRAVENOUS ONCE AS NEEDED
Status: DISCONTINUED | OUTPATIENT
Start: 2023-12-07 | End: 2023-12-07 | Stop reason: HOSPADM

## 2023-12-07 RX ORDER — SODIUM CHLORIDE, SODIUM LACTATE, POTASSIUM CHLORIDE, CALCIUM CHLORIDE 600; 310; 30; 20 MG/100ML; MG/100ML; MG/100ML; MG/100ML
100 INJECTION, SOLUTION INTRAVENOUS CONTINUOUS
Status: DISCONTINUED | OUTPATIENT
Start: 2023-12-07 | End: 2023-12-07 | Stop reason: HOSPADM

## 2023-12-07 RX ORDER — HYDROMORPHONE HYDROCHLORIDE 1 MG/ML
INJECTION, SOLUTION INTRAMUSCULAR; INTRAVENOUS; SUBCUTANEOUS AS NEEDED
Status: DISCONTINUED | OUTPATIENT
Start: 2023-12-07 | End: 2023-12-07

## 2023-12-07 RX ORDER — ONDANSETRON HYDROCHLORIDE 2 MG/ML
INJECTION, SOLUTION INTRAVENOUS AS NEEDED
Status: DISCONTINUED | OUTPATIENT
Start: 2023-12-07 | End: 2023-12-07

## 2023-12-07 RX ORDER — FENTANYL CITRATE 50 UG/ML
INJECTION, SOLUTION INTRAMUSCULAR; INTRAVENOUS AS NEEDED
Status: DISCONTINUED | OUTPATIENT
Start: 2023-12-07 | End: 2023-12-07

## 2023-12-07 RX ORDER — OXYCODONE AND ACETAMINOPHEN 5; 325 MG/1; MG/1
1 TABLET ORAL EVERY 4 HOURS PRN
Status: DISCONTINUED | OUTPATIENT
Start: 2023-12-07 | End: 2023-12-07 | Stop reason: HOSPADM

## 2023-12-07 RX ORDER — DEXAMETHASONE SODIUM PHOSPHATE 100 MG/10ML
INJECTION INTRAMUSCULAR; INTRAVENOUS AS NEEDED
Status: DISCONTINUED | OUTPATIENT
Start: 2023-12-07 | End: 2023-12-07

## 2023-12-07 RX ORDER — LIDOCAINE HYDROCHLORIDE 10 MG/ML
0.1 INJECTION INFILTRATION; PERINEURAL ONCE
Status: DISCONTINUED | OUTPATIENT
Start: 2023-12-07 | End: 2023-12-07 | Stop reason: HOSPADM

## 2023-12-07 RX ORDER — PROPOFOL 10 MG/ML
INJECTION, EMULSION INTRAVENOUS AS NEEDED
Status: DISCONTINUED | OUTPATIENT
Start: 2023-12-07 | End: 2023-12-07

## 2023-12-07 RX ORDER — CEFAZOLIN 1 G/1
INJECTION, POWDER, FOR SOLUTION INTRAVENOUS AS NEEDED
Status: DISCONTINUED | OUTPATIENT
Start: 2023-12-07 | End: 2023-12-07

## 2023-12-07 RX ADMIN — SODIUM CHLORIDE, SODIUM LACTATE, POTASSIUM CHLORIDE, AND CALCIUM CHLORIDE: .6; .31; .03; .02 INJECTION, SOLUTION INTRAVENOUS at 07:44

## 2023-12-07 RX ADMIN — DEXAMETHASONE SODIUM PHOSPHATE 8 MG: 10 INJECTION INTRAMUSCULAR; INTRAVENOUS at 08:03

## 2023-12-07 RX ADMIN — MIDAZOLAM 2 MG: 1 INJECTION INTRAMUSCULAR; INTRAVENOUS at 07:20

## 2023-12-07 RX ADMIN — PROPOFOL 60 MG: 10 INJECTION, EMULSION INTRAVENOUS at 07:50

## 2023-12-07 RX ADMIN — GLYCOPYRROLATE 0.1 MG: 0.2 INJECTION INTRAMUSCULAR; INTRAVENOUS at 08:03

## 2023-12-07 RX ADMIN — DEXMEDETOMIDINE HYDROCHLORIDE 4 MCG: 100 INJECTION, SOLUTION INTRAVENOUS at 08:17

## 2023-12-07 RX ADMIN — FENTANYL CITRATE 100 MCG: 50 INJECTION, SOLUTION INTRAMUSCULAR; INTRAVENOUS at 07:20

## 2023-12-07 RX ADMIN — PROPOFOL 200 MG: 10 INJECTION, EMULSION INTRAVENOUS at 07:49

## 2023-12-07 RX ADMIN — CEFAZOLIN 2 G: 1 INJECTION, POWDER, FOR SOLUTION INTRAMUSCULAR; INTRAVENOUS at 08:00

## 2023-12-07 RX ADMIN — ONDANSETRON 4 MG: 2 INJECTION INTRAMUSCULAR; INTRAVENOUS at 08:45

## 2023-12-07 RX ADMIN — DEXMEDETOMIDINE HYDROCHLORIDE 8 MCG: 100 INJECTION, SOLUTION INTRAVENOUS at 08:27

## 2023-12-07 RX ADMIN — HYDROMORPHONE HYDROCHLORIDE 0.5 MG: 1 INJECTION, SOLUTION INTRAMUSCULAR; INTRAVENOUS; SUBCUTANEOUS at 08:47

## 2023-12-07 RX ADMIN — DEXMEDETOMIDINE HYDROCHLORIDE 8 MCG: 100 INJECTION, SOLUTION INTRAVENOUS at 08:22

## 2023-12-07 RX ADMIN — GLYCOPYRROLATE 0.1 MG: 0.2 INJECTION INTRAMUSCULAR; INTRAVENOUS at 08:45

## 2023-12-07 ASSESSMENT — PAIN - FUNCTIONAL ASSESSMENT
PAIN_FUNCTIONAL_ASSESSMENT: 0-10

## 2023-12-07 ASSESSMENT — COLUMBIA-SUICIDE SEVERITY RATING SCALE - C-SSRS
2. HAVE YOU ACTUALLY HAD ANY THOUGHTS OF KILLING YOURSELF?: NO
1. IN THE PAST MONTH, HAVE YOU WISHED YOU WERE DEAD OR WISHED YOU COULD GO TO SLEEP AND NOT WAKE UP?: NO
6. HAVE YOU EVER DONE ANYTHING, STARTED TO DO ANYTHING, OR PREPARED TO DO ANYTHING TO END YOUR LIFE?: NO

## 2023-12-07 ASSESSMENT — PAIN SCALES - GENERAL
PAINLEVEL_OUTOF10: 3
PAINLEVEL_OUTOF10: 0 - NO PAIN
PAINLEVEL_OUTOF10: 3
PAINLEVEL_OUTOF10: 0 - NO PAIN
PAINLEVEL_OUTOF10: 3
PAIN_LEVEL: 0
PAINLEVEL_OUTOF10: 0 - NO PAIN

## 2023-12-07 ASSESSMENT — PAIN DESCRIPTION - DESCRIPTORS: DESCRIPTORS: BURNING

## 2023-12-07 NOTE — ANESTHESIA PROCEDURE NOTES
Airway  Date/Time: 12/7/2023 7:51 AM  Urgency: elective    Airway not difficult    Staffing  Performed: CRNA   Authorized by: Alis Santacruz MD    Performed by: YORDY Aguilar-SHUBHAM  Patient location during procedure: OR    Indications and Patient Condition  Indications for airway management: anesthesia and airway protection  Spontaneous Ventilation: absent  Sedation level: deep  Preoxygenated: yes  Patient position: sniffing  Mask difficulty assessment: 0 - not attempted  Planned trial extubation    Final Airway Details  Final airway type: supraglottic airway      Successful airway: Supraglottic airway: Ambu.  Size 5     Number of attempts at approach: 1  Ventilation between attempts: none  Number of other approaches attempted: 0

## 2023-12-07 NOTE — ANESTHESIA PREPROCEDURE EVALUATION
"Patient: Rodney Rendon \"W P\"    Procedure Information       Anesthesia Start Date/Time: 12/07/23 0730    Procedure: Right distal biceps repair (Right: Arm Upper) - General plus block anesthesia    Location: [facility] [place] 03 / Virtual [facility] [place] [facility]    Surgeons: Jasmeet Nickerson MD            Relevant Problems   Anesthesia (within normal limits)      Cardiovascular   (+) Benign essential hypertension   (+) Thoracic ascending aortic aneurysm (CMS/HCC)      Endocrine   (+) Class 2 obesity with body mass index (BMI) of 37.0 to 37.9 in adult      GI (within normal limits)      /Renal (within normal limits)      Neuro/Psych (within normal limits)      Pulmonary   (+) Dyspnea on exertion      GI/Hepatic (within normal limits)      Hematology (within normal limits)      Musculoskeletal   (+) Primary osteoarthritis of left hip   (+) Primary osteoarthritis of right hip      Eyes, Ears, Nose, and Throat (within normal limits)      Infectious Disease (within normal limits)       Clinical information reviewed:   Tobacco  Allergies  Meds   Med Hx  Surg Hx   Fam Hx  Soc Hx        NPO Detail:  NPO/Void Status  Date of Last Liquid: 12/06/23  Time of Last Liquid: 1700  Date of Last Solid: 12/06/23  Time of Last Solid: 1700  Last Intake Type: Solid meal         Physical Exam    Airway  Mallampati: III  TM distance: >3 FB  Neck ROM: full     Cardiovascular - normal exam     Dental - normal exam     Pulmonary - normal exam  Breath sounds clear to auscultation     Abdominal - normal exam             Anesthesia Plan    ASA 3     general and regional   (Right interscalene BP block)  Patient did not smoke on day of procedure.    intravenous induction   Postoperative administration of opioids is intended.  Trial extubation is planned.  Anesthetic plan and risks discussed with patient and spouse.  Use of blood products discussed with patient and spouse who consented to blood products.    Plan discussed with CRNA and " attending.

## 2023-12-07 NOTE — BRIEF OP NOTE
"Date: 2023  OR Location: [facility] OR    Name: Rodney Rendon \"W P\", : 1970, Age: 52 y.o., MRN: [id number], Sex: male    Diagnosis  Pre-op Diagnosis     * Rupture of right biceps tendon, subsequent encounter [S46.211D] Post-op Diagnosis     * Rupture of right biceps tendon, subsequent encounter [S46.211D]     Procedures  Right distal biceps repair  04664 - IA RINSJ RPTD BICEPS/TRICEPS TDN DSTL W/WO TDN GRF      Surgeons      * Jasmeet Nickerson - Primary    Resident/Fellow/Other Assistant:  Surgeon(s) and Role:     * Harlan Skinner DO - Resident - Assisting    Procedure Summary  Anesthesia: General  ASA: III  Anesthesia Staff: Anesthesiologist: Alis Santacruz MD  CRNA: YORDY Aguilar-CRNA  Estimated Blood Loss: 5 mL  Intra-op Medications: * Intraprocedure medication information is unavailable because the case start and end events have not been set *           Anesthesia Record               Intraprocedure I/O Totals          Intake    Dexmedetomidine 0.00 mL    The total shown is the total volume documented since Anesthesia Start was filed.    Total Intake 0 mL          Specimen: No specimens collected     Staff:   Circulator: Roslyn Joaquin RN  Scrub Person: Royce Tompkins RN          Findings: consistent with pre op diagnosis     Complications:  None; patient tolerated the procedure well.     Disposition: PACU - hemodynamically stable.  Condition: stable  Specimens Collected: No specimens collected  Attending Attestation: I was present and scrubbed for the entire procedure.    Jasmeet Nickerson  Phone Number: [phone]  "

## 2023-12-07 NOTE — OP NOTE
"Right distal biceps repair (R) Operative Note     Date: 2023  OR Location: [facility] OR    Name: Rodney Rendon \"LUZ ABREU\", : 1970, Age: 52 y.o., MRN: [id number], Sex: male    Diagnosis  Pre-op Diagnosis     * Rupture of right biceps tendon, subsequent encounter [S46.211D] Post-op Diagnosis     * Rupture of right biceps tendon, subsequent encounter [S46.211D]     Procedures  Right distal biceps repair  99212 - IL RINSJ RPTD BICEPS/TRICEPS TDN DSTL W/WO TDN GRF      Surgeons      * Jasmeet Nickerson - Primary    Resident/Fellow/Other Assistant:  Surgeon(s) and Role:Fahad Skinner DO    Procedure Summary  Anesthesia: General  ASA: ASA status not filed in the log.  Anesthesia Staff: No anesthesia staff entered.  Estimated Blood Loss: 5mL  Intra-op Medications: * No intraprocedure medications in log *           Anesthesia Record               Intraprocedure I/O Totals       None           Specimen: No specimens collected     Staff:   Circulator: Roslyn Joaquin RN  Scrub Person: Royce Eric         Drains and/or Catheters: * None in log *    Tourniquet Times: 40 min        Implants: arthrex biceps button and screw    Findings: complete distal biceps rupture    Indications: LUZ Rendon is an 52 y.o. male who is having surgery for Strain of muscle, fascia and tendon of other parts of biceps, right arm, sequela [S46.211S].     The patient was seen in the preoperative area. The risks, benefits, complications, treatment options, non-operative alternatives, expected recovery and outcomes were discussed with the patient. The possibilities of reaction to medication, pulmonary aspiration, injury to surrounding structures, bleeding, recurrent infection, the need for additional procedures, failure to diagnose a condition, and creating a complication requiring transfusion or operation were discussed with the patient. The patient concurred with the proposed plan, giving informed consent.  The site of surgery was properly " noted/marked if necessary per policy. The patient has been actively warmed in preoperative area. Preoperative antibiotics have been ordered and given within 1 hours of incision. Venous thrombosis prophylaxis have been ordered including bilateral sequential compression devices    Procedure Details: The patient is a 52 y.o. year-old male  with a distal biceps rupture. he  elected to proceed with repair. I reviewed the risks including, but not limited to, bleeding;   infection; injury to surrounding tissue, nerves, vessels; DVT/PE; re-rupture; elbow stiffness; and the potential need for future surgery.  Patient understood fully and wished to proceed.       PROCEDURE IN DETAIL:   The patient met in the preoperative holding area.  right elbow identified as the correct operative limb by myself, the patient as well as the attending anesthesiologist, was marked with an indelible marker.  Informed consent was in the chart.  Patient received a regional nerve block by Anesthesia.  Patient was taken back to the OR, placed supine on the operating bed in stable condition.  At this point, a formal interdisciplinary huddle was carried out, correctly identifying the patient, procedure, correct operative limb, and all necessary equipment and all were in agreement.  Patient was surrendered under general anesthetic.  Airway was secured with an LMA tube.  Once was asleep, he was positioned in the supine position with an arm table.  Nonsterile arm tourniquet was placed.  SCDs were placed.  The operative arm was then prepped and draped in a standard sterile fashion. Patient received Ancef for antibiotics.  A time-out was called.  The limb was exsanguinated, tourniquet inflated.  I began with a 3 cm incision 3 fingerbreadths distal to the elbow flexion crease transversely, taken sharply down to subcutaneous tissue.  The fascia was opened.  The LABC was identified and retracted out of the way. The biceps tendon was completely rupture and  was mobilized from th lacertus fibrosis which was still intact. There was adequate excursion. The tendon was then trimmed and whipstitched with a #2 FiberLoop. The interval between the pronator teres and the mobile wad was developed.  There were several venous lesions that were released with bipolar electrocautery.  Under direct visualization, a guide pin was advanced across the radius in the center of the radial tuberosity.  This was bicortical.  It was then overdrilled with an 8 mm reamer.  The sutures from the biceps were loaded onto Arthrex biceps button, which was then passed bicortically and flipped, and then, using the tension slide technique, the biceps was brought into the socket created by the reamer.  It was then tied and passed through the tendon and tied again.  Finally, a 7 mm interference screw was then placed pushing the tendon in an ulnar direction.  This completed our repair.  Fluoroscopy confirmed appropriate placement of our tunnel and our button.  The tourniquet was deflated at 40 minutes.  Hemostasis was obtained.  The wound was copiously irrigated.  The subcuticular closed with 2-0 Vicryl and 3-0 Monocryl for the skin and Dermabond and sterile dressing was placed.  Patient was then placed into a long-arm splint in neutral rotation and 90 degrees of flexion.  At the end of the case, all sponge, needle, and instrument counts correct.  Patient was then extubated and taken to PACU without complication.       POSTOPERATIVE PLAN:   The patient will be discharged home and will remain in the splint. We will see patient in the office in a week for wound check.    Complications:  None; patient tolerated the procedure well.    Disposition: PACU - hemodynamically stable.  Condition: stable     Attending Attestation: I was present and scrubbed for the entire procedure.    Jasmeet Nickerson  Phone Number: [phone]

## 2023-12-07 NOTE — ANESTHESIA POSTPROCEDURE EVALUATION
"Patient: Rodney Rendon \"W P\"    Procedure Summary       Date: 12/07/23 Room / Location: [facility] [place] [facility] 03 / [facility] [place]    Anesthesia Start: 0730 Anesthesia Stop: 0930    Procedure: Right distal biceps repair (Right: Arm Upper) Diagnosis:       Rupture of right biceps tendon, subsequent encounter      (Strain of muscle, fascia and tendon of other parts of biceps, right arm, sequela [S46.211S])    Surgeons: Jasmeet Nickerson MD Responsible Provider: Alis Santacruz MD    Anesthesia Type: general ASA Status: 3            Anesthesia Type: general    Vitals Value Taken Time   /61 12/07/23 1024   Temp 36.1 °C (97 °F) 12/07/23 0924   Pulse 82 12/07/23 1024   Resp 16 12/07/23 1024   SpO2 92 % 12/07/23 1024       Anesthesia Post Evaluation    Patient location during evaluation: PACU  Patient participation: complete - patient participated  Level of consciousness: awake  Pain score: 0  Pain management: adequate (nerve block in place)  Airway patency: patent  Cardiovascular status: acceptable  Respiratory status: acceptable  Hydration status: acceptable  Postoperative Nausea and Vomiting: none  Comments: No PONV        There were no known notable events for this encounter.    "

## 2023-12-07 NOTE — ANESTHESIA PROCEDURE NOTES
Peripheral Block    Patient location during procedure: pre-op  Start time: 12/7/2023 7:20 AM  End time: 12/7/2023 7:28 AM  Reason for block: at surgeon's request and post-op pain management  Staffing  Performed: attending   Authorized by: Alis Santacruz MD    Performed by: Alis Santacruz MD  Preanesthetic Checklist  Completed: patient identified, IV checked, site marked, risks and benefits discussed, surgical consent, monitors and equipment checked, pre-op evaluation and timeout performed   Timeout performed at: 12/7/2023 7:20 AM  Peripheral Block  Patient position: laying flat  Prep: ChloraPrep  Patient monitoring: heart rate and continuous pulse ox  Block type: interscalene  Laterality: right  Injection technique: single-shot  Guidance: nerve stimulator and ultrasound guided  Needle  Needle gauge: 22 G  Needle length: 5 cm  Needle localization: ultrasound guidance     image stored in chart  Assessment  Injection assessment: negative aspiration for heme, no paresthesia on injection, incremental injection, local visualized surrounding nerve on ultrasound and transient paresthesias  Paresthesia pain: immediately resolved  Heart rate change: no  Slow fractionated injection: yes  Additional Notes  Desi nerve stim U/S needle used  Ropivacaine 0.5% 20 ml and methylprednisolone 40 mg (1ml) injected

## 2023-12-12 ENCOUNTER — TELEPHONE (OUTPATIENT)
Dept: ORTHOPEDIC SURGERY | Facility: CLINIC | Age: 53
End: 2023-12-12
Payer: COMMERCIAL

## 2023-12-12 NOTE — TELEPHONE ENCOUNTER
Form received at [facility] on 7/9/2019.     Please note that it takes 7-10 business days for completion.     Signed authorization received with form.     Patient called the office living a message that he had a little bit of swelling around his ankles.  I called him to discuss.  He has some mild swelling sort of at the sock line there is no pitting edema he is no swelling up the leg or the calf he has no redness or pain to palpation of bilateral calves.  No chest pain or shortness of breath.  Reviewed this is most likely some dependent edema due to inactivity recent surgery.  I recommended he elevate his legs when sitting or lying.  He will update us if things do not improve or worsen.

## 2023-12-19 ENCOUNTER — OFFICE VISIT (OUTPATIENT)
Dept: ORTHOPEDIC SURGERY | Facility: CLINIC | Age: 53
End: 2023-12-19
Payer: COMMERCIAL

## 2023-12-19 DIAGNOSIS — Z48.89 AFTERCARE FOLLOWING SURGERY: Primary | ICD-10-CM

## 2023-12-19 PROCEDURE — 1036F TOBACCO NON-USER: CPT | Performed by: STUDENT IN AN ORGANIZED HEALTH CARE EDUCATION/TRAINING PROGRAM

## 2023-12-19 PROCEDURE — 99024 POSTOP FOLLOW-UP VISIT: CPT | Performed by: STUDENT IN AN ORGANIZED HEALTH CARE EDUCATION/TRAINING PROGRAM

## 2023-12-19 NOTE — LETTER
December 19, 2023     Patient: Rodney Rendon   YOB: 1970   Date of Visit: 12/19/2023       To Whom It May Concern:    It is my medical opinion that Rodney Rendon  should continue with the hybrid work schedule of 3 days work at home and 2 days in the office for estimated 4 weeks at this time will be reevaluated .    If you have any questions or concerns, please don't hesitate to call.         Sincerely,        Jasmeet Nickerson MD    CC: No Recipients   yes

## 2023-12-19 NOTE — PROGRESS NOTES
Doing well, no issues. Taking ASA.    Review of Systems  A complete review of systems was conducted, pertinent only to the HPI noted above.  Constitutional: None  Eyes: No additions to above history  Ears, Nose, Throat: No additions to above history  Cardiovascular: No additions to above history  Respiratory: No additions to above history  GI: No additions to above history  : No additions to above history  Skin/Neuro: No additions to above history  Endocrine/Heme/Lymph: No additions to above history  Immunologic: No additions to above history  Psychiatric: No additions to above history  Musculoskeletal: see above    Physical Exam  GEN: Alert and Oriented x 3  Constitutional: Well appearing, in no apparent distress.        Focused Musculoskeletal Exam:     RIGHT   elbow:   incision closed, no signs of infection, negative hook sign biceps palpably intact, mild LABC numbness    Sensation intact Ax/median/ulnar/radial distributions  Motor intact Ax/median/radial/ulnar/AIN/PIN    Patient was prescribed a hinged elbow brace] for [distal biceps rupture]. The patient has weakness, instability and/or deformity of their Right elbow which requires stabilization from this orthosis to improve their function.  Verbal and written instructions for the use, wear schedule, cleaning and application of this item were given.  Patient was instructed that should the brace result in increased pain, decreased sensation, increased swelling, or an overall worsening of their medical condition, to please contact our office immediately. Orthotic management and training was provided for skin care, modifications due to healing tissues, edema changes, interruption in skin integrity, and safety precautions with the orthosis.      Doing very well we switched him to the hinged elbow brace and he may initiate physical therapy we will see them back in the office in 4 weeks all questions answered, patient in agreement with the plan.

## 2023-12-26 ENCOUNTER — EVALUATION (OUTPATIENT)
Dept: OCCUPATIONAL THERAPY | Facility: CLINIC | Age: 53
End: 2023-12-26
Payer: COMMERCIAL

## 2023-12-26 DIAGNOSIS — S46.211S BICEPS RUPTURE, DISTAL, RIGHT, SEQUELA: Primary | ICD-10-CM

## 2023-12-26 PROCEDURE — 97165 OT EVAL LOW COMPLEX 30 MIN: CPT | Mod: GO

## 2023-12-26 ASSESSMENT — PAIN - FUNCTIONAL ASSESSMENT: PAIN_FUNCTIONAL_ASSESSMENT: 0-10

## 2023-12-26 ASSESSMENT — PAIN SCALES - GENERAL: PAINLEVEL_OUTOF10: 4

## 2023-12-26 NOTE — PROGRESS NOTES
"Occupational Therapy Evaluation    Name: Rodney Rendon \"W P\"  MRN: [id number]  : 1970  Date: 23  Time Calculation  Start Time: 905  Stop Time: 935  Time Calculation (min): 30 min    Assessment:  OT Assessment  OT Assessment Results: Decreased ADL status, Decreased upper extremity range of motion, Decreased upper extremity strength  Plan:  Outpatient Plan  Frequency: 1-2x/week  Duration: 8 weeks  Onset Date: 23  Rehab Potential: Good  Plan of Care Agreement: Patient    Subjective   Current Problem:  1. Biceps rupture, distal, right, sequela          General:      General  Reason for Referral: s/p R distal biceps repair  Referred By: Dr. Nickerson  Precautions:  Precautions  Post-Surgical Precautions: Other (comment)  Splinting: Distal biceps protocol  Prosthesis/Orthosis Used: Other (comment)  Precautions Comment: Hinged Elbow brace    Pain Assessment:  Pain Assessment  Pain Assessment: 0-10  Pain Score: 4  Pain Type: Surgical pain  Pain Location: Elbow  Pain Orientation: Right    Objective     Home Living:     Prior Function Per Pt/Caregiver Report:  Prior Function Per Pt/Caregiver Report  Hand Dominance: Right  IADL History:      ADL: impaired      Activity Tolerance:     Modalities:     Sensation:  Light Touch:  (LABC numbness)  Strength:     Perception:     Coordination:     Hand Function: WNL     Extremities: RUE AROM (degrees)  R Elbow Flexion/Extension 0-135-150:  (-30/130)  R Forearm Pronation  0-80-90: 45 Degrees  R Forearm Supination  0-80-90: 45 Degrees  RUE Strength  R Elbow Flexion:  (not asssessed due to precautions)      Outcome Measures:  DASH: 84.09    OP EDUCATION:  Education  Individual(s) Educated: Patient  Education Provided: Anatomy & Physiology, Diagnosis & Precautions  Home Program: PROM, AAROM, AROM, Orthotic wearing schedule, care and precautions  Risk and Benefits Discussed with Patient/Caregiver/Other: yes  Patient/Caregiver Demonstrated Understanding: yes  Plan of Care " Discussed and Agreed Upon: yes  Patient Response to Education: Patient/Caregiver Verbalized Understanding of Information, Patient/Caregiver Performed Return Demonstration of Exercises/Activities    Goals:  Active       OT Problem       PATIENT IS ABLE TO OPEN ITEMS independently TO ACCESS MEAL ITEMS       Start:  12/26/23    Expected End:  01/26/24            PATIENT WILL ACHIEVE right ELBOW FLEXION STRENGTH OF 5/5       Start:  12/26/23    Expected End:  02/24/24            PATIENT WILL ACHIEVE right ELBOW EXTENSION STRENGTH OF 5/5       Start:  12/26/23    Expected End:  02/24/24            PATIENT WILL ACHIEVE right FOREARM SUPINATION STRENGTH OF 5/5       Start:  12/26/23    Expected End:  02/24/24            PATIENT WILL ACHIEVE right  STRENGTH  lbs IN THE two position       Start:  12/26/23    Expected End:  02/24/24            PATIENT WILL ACHIEVE right ELBOW ROM 0-140 DEGREES       Start:  12/26/23    Expected End:  01/18/24            PATIENT WILL SHOW A SIGNIFICANT CHANGE IN DASH PATIENT REPORTED OUTCOME TOOL TO DEMONSTRATE SUBJECTIVE IMPROVEMENT       Start:  12/26/23    Expected End:  02/24/24            PATIENT WILL DEMONSTRATE INDEPENDENCE IN HOME PROGRAM FOR SUPPORT OF PROGRESSION       Start:  12/26/23    Expected End:  02/24/24            PATIENT WILL REPORT PAIN OF 0/10 DEMONSTRATING A REDUCTION OF OVERALL PAIN       Start:  12/26/23    Expected End:  02/02/24            PATIENT WILL DEMONSTRATE ABILITY TO FOLLOW biceps  PRECUATIONS INDEPENDENTLY       Start:  12/26/23    Expected End:  01/04/24              Resolved       OT Goals       OT Goal 1 (Not Progressing)       Start:  11/06/23    Expected End:  12/08/23    Resolved:  12/26/23    Pt will have full strength in R elbow and forearm to return to work duties            OT Problem       PATIENT WILL REPORT PAIN OF 0/10 DEMONSTRATING A REDUCTION OF OVERALL PAIN (Not Progressing)       Start:  11/06/23    Expected End:  12/08/23     Resolved:  12/26/23

## 2024-01-03 ENCOUNTER — TREATMENT (OUTPATIENT)
Dept: OCCUPATIONAL THERAPY | Facility: CLINIC | Age: 54
End: 2024-01-03
Payer: COMMERCIAL

## 2024-01-03 DIAGNOSIS — S46.211S BICEPS RUPTURE, DISTAL, RIGHT, SEQUELA: Primary | ICD-10-CM

## 2024-01-03 PROCEDURE — 97140 MANUAL THERAPY 1/> REGIONS: CPT | Mod: GO

## 2024-01-03 PROCEDURE — 97110 THERAPEUTIC EXERCISES: CPT | Mod: GO

## 2024-01-03 NOTE — PROGRESS NOTES
"Occupational Therapy Treatment    Name: Rodney Rendon \"W P\"  MRN: [id number]  : 1970  Date: 24  Time Calculation  Start Time: 1605  Stop Time: 1645  Time Calculation (min): 40 min    Assessment:   Pt has moderate scar tissue thickening. Reports tightness R forearm and wrist with active motion      Plan:  Continue with biceps protocol       Subjective   I feel a little bit of tightness in my arm   General:        Pain Assessment:  2-3/10 R forearm       Objective    PROM elbow extension/flexion: -10/140    Modalities:     Communication:     Splinting:     Therapeutic Exercise  Wrist flexion and extension with 2 lb x 15x3  Green digit flex x 15x3      Manual Therapy  Scar mobs R forearm   Passive wrist flexor/extensor stretch   Passive elbow flexion and supination        Therapy/Activity:   Strength:     Other Activity:           OP EDUCATION:       Goals:  Active       OT Problem       PATIENT IS ABLE TO OPEN ITEMS independently TO ACCESS MEAL ITEMS       Start:  23    Expected End:  24            PATIENT WILL ACHIEVE right ELBOW FLEXION STRENGTH OF 5/5       Start:  23    Expected End:  24            PATIENT WILL ACHIEVE right ELBOW EXTENSION STRENGTH OF 5/5       Start:  23    Expected End:  24            PATIENT WILL ACHIEVE right FOREARM SUPINATION STRENGTH OF 5/5       Start:  23    Expected End:  24            PATIENT WILL ACHIEVE right  STRENGTH  lbs IN THE two position       Start:  23    Expected End:  24            PATIENT WILL ACHIEVE right ELBOW ROM 0-140 DEGREES       Start:  23    Expected End:  24            PATIENT WILL SHOW A SIGNIFICANT CHANGE IN DASH PATIENT REPORTED OUTCOME TOOL TO DEMONSTRATE SUBJECTIVE IMPROVEMENT       Start:  23    Expected End:  24            PATIENT WILL DEMONSTRATE INDEPENDENCE IN HOME PROGRAM FOR SUPPORT OF PROGRESSION       Start:  23    Expected End:  24 "            PATIENT WILL REPORT PAIN OF 0/10 DEMONSTRATING A REDUCTION OF OVERALL PAIN       Start:  12/26/23    Expected End:  02/02/24            PATIENT WILL DEMONSTRATE ABILITY TO FOLLOW biceps  PRECUATIONS INDEPENDENTLY       Start:  12/26/23    Expected End:  01/04/24              Resolved       OT Goals       OT Goal 1 (Not Progressing)       Start:  11/06/23    Expected End:  12/08/23    Resolved:  12/26/23    Pt will have full strength in R elbow and forearm to return to work duties            OT Problem       PATIENT WILL REPORT PAIN OF 0/10 DEMONSTRATING A REDUCTION OF OVERALL PAIN (Not Progressing)       Start:  11/06/23    Expected End:  12/08/23    Resolved:  12/26/23

## 2024-01-16 ENCOUNTER — APPOINTMENT (OUTPATIENT)
Dept: ORTHOPEDIC SURGERY | Facility: CLINIC | Age: 54
End: 2024-01-16
Payer: COMMERCIAL

## 2024-01-16 ENCOUNTER — OFFICE VISIT (OUTPATIENT)
Dept: ORTHOPEDIC SURGERY | Facility: CLINIC | Age: 54
End: 2024-01-16
Payer: COMMERCIAL

## 2024-01-16 DIAGNOSIS — Z48.89 AFTERCARE FOLLOWING SURGERY: Primary | ICD-10-CM

## 2024-01-16 PROCEDURE — 99024 POSTOP FOLLOW-UP VISIT: CPT | Performed by: STUDENT IN AN ORGANIZED HEALTH CARE EDUCATION/TRAINING PROGRAM

## 2024-01-16 PROCEDURE — 1036F TOBACCO NON-USER: CPT | Performed by: STUDENT IN AN ORGANIZED HEALTH CARE EDUCATION/TRAINING PROGRAM

## 2024-01-16 NOTE — LETTER
January 16, 2024     Patient: Rodney Rendon   YOB: 1970   Date of Visit: 1/16/2024       To Whom It May Concern:    It is my medical opinion that Rodney Rendon  may return to work with a weight restriction of 25lbs .    If you have any questions or concerns, please don't hesitate to call.         Sincerely,        Jasmeet Nickerson MD    CC: No Recipients

## 2024-01-16 NOTE — PROGRESS NOTES
Doing well, no issues.  Continues with therapy    Review of Systems  A complete review of systems was conducted, pertinent only to the HPI noted above.  Constitutional: None  Eyes: No additions to above history  Ears, Nose, Throat: No additions to above history  Cardiovascular: No additions to above history  Respiratory: No additions to above history  GI: No additions to above history  : No additions to above history  Skin/Neuro: No additions to above history  Endocrine/Heme/Lymph: No additions to above history  Immunologic: No additions to above history  Psychiatric: No additions to above history  Musculoskeletal: see above    Physical Exam  GEN: Alert and Oriented x 3  Constitutional: Well appearing, in no apparent distress.        Focused Musculoskeletal Exam:     RIGHT   elbow:   incision healed negative hook sign biceps palpably intact, mild improving LABC numbness full elbow extension and flexion symmetric pronosupination    Sensation intact Ax/median/ulnar/radial distributions  Motor intact Ax/median/radial/ulnar/AIN/PIN    He is doing great can discontinue the brace continue with therapy work on strengthening return to work with a lifting restriction see him back in 6 weeks.  All questions answered, patient in agreement with the plan.

## 2024-01-17 ENCOUNTER — TREATMENT (OUTPATIENT)
Dept: OCCUPATIONAL THERAPY | Facility: CLINIC | Age: 54
End: 2024-01-17
Payer: COMMERCIAL

## 2024-01-17 DIAGNOSIS — S46.211S BICEPS RUPTURE, DISTAL, RIGHT, SEQUELA: Primary | ICD-10-CM

## 2024-01-17 PROCEDURE — 97110 THERAPEUTIC EXERCISES: CPT | Mod: GO

## 2024-01-17 PROCEDURE — 97140 MANUAL THERAPY 1/> REGIONS: CPT | Mod: GO

## 2024-01-17 NOTE — PROGRESS NOTES
"Occupational Therapy  Occupational Therapy Treatment    Name: Rodney Rendon \"W P\"  MRN: [id number]  : 1970  Date: 24  Time Calculation  Start Time: 410  Stop Time: 450  Time Calculation (min): 40 min    Assessment:   Pt no longer needs to wear his hinged elbow brace. Began light PRE's for biceps and R UE. Tolerated well with no increase in pain, just reported fatigue and weakness        Plan:  Advance to light PRE's       Subjective   \"It's good to start using my arm again.\"  General:        Pain Assessment:  -2/10 R forearm       Objective    PROM/AROM R  elbow extension/flexion: 0/140    Modalities:     Communication:     Splinting:     Therapeutic Exercise  UBE x 6  B cable curl with 7 lbs x 15x3  Triceps extensions x 15x3 with 7 lbs   Alternating biceps curl with 5 lbs x 15x3  Pro/Sup B with 5 lbs x 10x2  Carrying 5 lb KB x 15 ft x 5 with elbow flexed at 90 degrees   B ER and horizontal abduction x 15x3 with yellow theraband      Manual Therapy  Scar mobs R forearm          Therapy/Activity:   Strength:     Other Activity:           OP EDUCATION: Pt issued yellow theraband for HEP        Goals:  Active       OT Problem       PATIENT IS ABLE TO OPEN ITEMS independently TO ACCESS MEAL ITEMS       Start:  23    Expected End:  24            PATIENT WILL ACHIEVE right ELBOW FLEXION STRENGTH OF 5/5       Start:  23    Expected End:  24            PATIENT WILL ACHIEVE right ELBOW EXTENSION STRENGTH OF 5/5       Start:  23    Expected End:  24            PATIENT WILL ACHIEVE right FOREARM SUPINATION STRENGTH OF 5/5       Start:  23    Expected End:  24            PATIENT WILL ACHIEVE right  STRENGTH  lbs IN THE two position       Start:  23    Expected End:  24            PATIENT WILL ACHIEVE right ELBOW ROM 0-140 DEGREES       Start:  23    Expected End:  24            PATIENT WILL SHOW A SIGNIFICANT CHANGE IN DASH PATIENT " REPORTED OUTCOME TOOL TO DEMONSTRATE SUBJECTIVE IMPROVEMENT       Start:  12/26/23    Expected End:  02/24/24            PATIENT WILL DEMONSTRATE INDEPENDENCE IN HOME PROGRAM FOR SUPPORT OF PROGRESSION       Start:  12/26/23    Expected End:  02/24/24            PATIENT WILL REPORT PAIN OF 0/10 DEMONSTRATING A REDUCTION OF OVERALL PAIN       Start:  12/26/23    Expected End:  02/02/24            PATIENT WILL DEMONSTRATE ABILITY TO FOLLOW biceps  PRECUATIONS INDEPENDENTLY       Start:  12/26/23    Expected End:  01/04/24              Resolved       OT Goals       OT Goal 1 (Not Progressing)       Start:  11/06/23    Expected End:  12/08/23    Resolved:  12/26/23    Pt will have full strength in R elbow and forearm to return to work duties            OT Problem       PATIENT WILL REPORT PAIN OF 0/10 DEMONSTRATING A REDUCTION OF OVERALL PAIN (Not Progressing)       Start:  11/06/23    Expected End:  12/08/23    Resolved:  12/26/23

## 2024-01-19 ENCOUNTER — OFFICE VISIT (OUTPATIENT)
Dept: PRIMARY CARE | Facility: CLINIC | Age: 54
End: 2024-01-19
Payer: COMMERCIAL

## 2024-01-19 VITALS
HEART RATE: 79 BPM | DIASTOLIC BLOOD PRESSURE: 78 MMHG | BODY MASS INDEX: 36.59 KG/M2 | SYSTOLIC BLOOD PRESSURE: 122 MMHG | WEIGHT: 255 LBS | OXYGEN SATURATION: 98 %

## 2024-01-19 DIAGNOSIS — F41.9 ANXIETY: Primary | ICD-10-CM

## 2024-01-19 PROCEDURE — 99213 OFFICE O/P EST LOW 20 MIN: CPT | Performed by: FAMILY MEDICINE

## 2024-01-19 PROCEDURE — 1036F TOBACCO NON-USER: CPT | Performed by: FAMILY MEDICINE

## 2024-01-19 PROCEDURE — 3074F SYST BP LT 130 MM HG: CPT | Performed by: FAMILY MEDICINE

## 2024-01-19 PROCEDURE — 3078F DIAST BP <80 MM HG: CPT | Performed by: FAMILY MEDICINE

## 2024-01-19 RX ORDER — ESCITALOPRAM OXALATE 10 MG/1
TABLET ORAL
Qty: 30 TABLET | Refills: 1 | Status: SHIPPED | OUTPATIENT
Start: 2024-01-19

## 2024-01-19 NOTE — PROGRESS NOTES
Subjective   Patient ID: LUZ Rendon is a 53 y.o. male who presents for Follow-up and Personal Problem.    HPI     Interested in help for anxiety -     Can tell his stress is getting to him more now -   Work is very difficult - lots of stressors    Feeling lack of motivation often -   Irritated often     Knows he needs something to help him           Review of Systems    Objective   /78 (BP Location: Left arm, Patient Position: Sitting, BP Cuff Size: Large adult)   Pulse 79   Wt 116 kg (255 lb)   SpO2 98%   BMI 36.59 kg/m²     Physical Exam  Vitals reviewed.   Constitutional:       General: He is not in acute distress.     Appearance: Normal appearance.   HENT:      Head: Normocephalic and atraumatic.      Nose: Nose normal.      Mouth/Throat:      Pharynx: No posterior oropharyngeal erythema.   Eyes:      Extraocular Movements: Extraocular movements intact.      Conjunctiva/sclera: Conjunctivae normal.      Pupils: Pupils are equal, round, and reactive to light.   Pulmonary:      Effort: Pulmonary effort is normal.   Skin:     General: Skin is warm and dry.      Findings: No rash.   Neurological:      General: No focal deficit present.      Mental Status: He is alert. Mental status is at baseline.   Psychiatric:         Mood and Affect: Mood normal.         Thought Content: Thought content normal.         Assessment/Plan   Problem List Items Addressed This Visit    None  Visit Diagnoses         Codes    Anxiety    -  Primary F41.9    Relevant Medications    escitalopram (Lexapro) 10 mg tablet          Agreed to try escitalopram  -   Education on med  -   And urged counseling to vent more     Agreed to virtual follow up in a month     We discussed at visit any disease processes that were of concern as well as the risks, benefits and instructions of any new medication provided.    See orders and discussion section for information handed to patient on their Clinical Summary.   Patient (and/or caretaker of  patient if present)  stated all questions were answered, and they voiced understanding of instructions.

## 2024-01-19 NOTE — PATIENT INSTRUCTIONS
Lets have you try escitalopram 10 mg -   1/2 daily for 7 days then one daily     Please think about starting counseling    Counseling Services   (Many places are doing virtual appointments with patients)       [facility]   [phone]  Variety of locations  [email or web address]    [facility]  [phone] [address]  [place] [address]      [place]     [phone]  [address] [place]   [address]  [place]    [facility]  [phone] [address]  [place] [address]  [employer]        [facility]  [phone]  [address]  [place] [address]    [facility]   General number: [phone]  [email or web address]  *no kids*     [facility]  [phone] [address] [place] [address]  *no kids*     [facility]  [phone] [address] [place] [address]  *no kids*     [facility]  [phone] [address]  [place] [address]    [employer]: [facility]   [phone]  [facility]  [address] [place], [place], [address]  Walk in hours: Mon - Fri 1:00 PM - 5:00 PM    [employer]: [facility]   [phone] <[phone]>)  [address] [place], [place], [address]  Walk in hours: Mon - Fri  11:00 AM - 1:30 PM

## 2024-01-24 ENCOUNTER — TREATMENT (OUTPATIENT)
Dept: OCCUPATIONAL THERAPY | Facility: CLINIC | Age: 54
End: 2024-01-24
Payer: COMMERCIAL

## 2024-01-24 DIAGNOSIS — S46.211S BICEPS RUPTURE, DISTAL, RIGHT, SEQUELA: Primary | ICD-10-CM

## 2024-01-24 PROCEDURE — 97110 THERAPEUTIC EXERCISES: CPT | Mod: GO

## 2024-01-24 NOTE — PROGRESS NOTES
"    Occupational Therapy  Occupational Therapy Treatment    Name: Rodney Rendon \"W P\"  MRN: [id number]  : 1970  Date: 24  Time Calculation  Start Time: 1645  Stop Time: 1730  Time Calculation (min): 45 min    Assessment:   Pt is tolerating exercises well. Reported some muscular soreness after initiating strengthening exercises that lasted a few days. Reports fatigue RUE with strengthening exercises      Plan:  Continue to progress strengthening as tolerated        Subjective   \"The muscle was a little sore for a few days after the last workout.\"  General:        Pain Assessment:  1-2/10 R forearm       Objective    PROM/AROM R  elbow extension/flexion: 0/140    Modalities:     Communication:     Splinting:     Therapeutic Exercise  UBE x 6  B cable curl with 7 lbs x 15x3  PNF diagonals x 15x3 with 5 lbs   Triceps extensions x 15x3 with 7 lbs   Alternating biceps curl with 5 lbs x 15x3  Pro/Sup B with red flexbar x 10x3   Wrist twist x 6 x2 with 2 lbs   Wrist flex/ext/pro/sup x 15x3 each with red theraband   Bodyblade- elbow flexion and scaption x 1 min x 3 sets each     Manual Therapy  Scar mobs R forearm          Therapy/Activity:   Strength:     Other Activity:           OP EDUCATION:        Goals:  Active       OT Problem       PATIENT IS ABLE TO OPEN ITEMS independently TO ACCESS MEAL ITEMS       Start:  23    Expected End:  24            PATIENT WILL ACHIEVE right ELBOW FLEXION STRENGTH OF 5/5       Start:  23    Expected End:  24            PATIENT WILL ACHIEVE right ELBOW EXTENSION STRENGTH OF 5/5       Start:  23    Expected End:  24            PATIENT WILL ACHIEVE right FOREARM SUPINATION STRENGTH OF 5/5       Start:  23    Expected End:  24            PATIENT WILL ACHIEVE right  STRENGTH  lbs IN THE two position       Start:  23    Expected End:  24            PATIENT WILL ACHIEVE right ELBOW ROM 0-140 DEGREES       Start:  " 12/26/23    Expected End:  01/18/24            PATIENT WILL SHOW A SIGNIFICANT CHANGE IN DASH PATIENT REPORTED OUTCOME TOOL TO DEMONSTRATE SUBJECTIVE IMPROVEMENT       Start:  12/26/23    Expected End:  02/24/24            PATIENT WILL DEMONSTRATE INDEPENDENCE IN HOME PROGRAM FOR SUPPORT OF PROGRESSION       Start:  12/26/23    Expected End:  02/24/24            PATIENT WILL REPORT PAIN OF 0/10 DEMONSTRATING A REDUCTION OF OVERALL PAIN       Start:  12/26/23    Expected End:  02/02/24            PATIENT WILL DEMONSTRATE ABILITY TO FOLLOW biceps  PRECUATIONS INDEPENDENTLY       Start:  12/26/23    Expected End:  01/04/24              Resolved       OT Goals       OT Goal 1 (Not Progressing)       Start:  11/06/23    Expected End:  12/08/23    Resolved:  12/26/23    Pt will have full strength in R elbow and forearm to return to work duties            OT Problem       PATIENT WILL REPORT PAIN OF 0/10 DEMONSTRATING A REDUCTION OF OVERALL PAIN (Not Progressing)       Start:  11/06/23    Expected End:  12/08/23    Resolved:  12/26/23

## 2024-01-30 ENCOUNTER — TREATMENT (OUTPATIENT)
Dept: OCCUPATIONAL THERAPY | Facility: CLINIC | Age: 54
End: 2024-01-30
Payer: COMMERCIAL

## 2024-01-30 DIAGNOSIS — S46.211S BICEPS RUPTURE, DISTAL, RIGHT, SEQUELA: Primary | ICD-10-CM

## 2024-01-30 PROCEDURE — 97110 THERAPEUTIC EXERCISES: CPT | Mod: GO

## 2024-01-30 NOTE — PROGRESS NOTES
"        Occupational Therapy  Occupational Therapy Treatment    Name: Rodney Rendon \"W P\"  MRN: [id number]  : 1970  Date: 24  Time Calculation  Start Time: 1620  Stop Time: 1700  Time Calculation (min): 40 min  Visit#5    Assessment:   Pt is tolerating exercises well.  Reported some discomfort in wrist at beginning of session, but it improved post session      Plan:  Continue to progress strengthening as tolerated        Subjective It's feeling pretty good today.\"  General:        Pain Assessment:  1-2/10 R wrist        Objective    PROM/AROM R  elbow extension/flexion: 0/140    Modalities:     Communication:     Splinting:     Therapeutic Exercise  UBE x 6  B cable curl with 7 lbs x 15x3  PNF diagonals x 15x3 with 5 lbs   Triceps extensions x 15x3 with 7 lbs   Alternating biceps curl with 5 lbs x 15x3  Pro/Sup B with red flexbar x 10x3   Wrist twist x 6 x2 with 2 lbs   Wrist flex/ext/pro/sup x 15x3 each with red theraband   Bodyblade- elbow flexion and scaption x 1 min x 3 sets each     Manual Therapy  Scar mobs R forearm          Therapy/Activity:   Strength:     Other Activity:           OP EDUCATION:        Goals:  Active       OT Problem       PATIENT IS ABLE TO OPEN ITEMS independently TO ACCESS MEAL ITEMS       Start:  23    Expected End:  24            PATIENT WILL ACHIEVE right ELBOW FLEXION STRENGTH OF 5/5       Start:  23    Expected End:  24            PATIENT WILL ACHIEVE right ELBOW EXTENSION STRENGTH OF 5/5       Start:  23    Expected End:  24            PATIENT WILL ACHIEVE right FOREARM SUPINATION STRENGTH OF 5/5       Start:  23    Expected End:  24            PATIENT WILL ACHIEVE right  STRENGTH  lbs IN THE two position       Start:  23    Expected End:  24            PATIENT WILL ACHIEVE right ELBOW ROM 0-140 DEGREES       Start:  23    Expected End:  24            PATIENT WILL SHOW A SIGNIFICANT " CHANGE IN DASH PATIENT REPORTED OUTCOME TOOL TO DEMONSTRATE SUBJECTIVE IMPROVEMENT       Start:  12/26/23    Expected End:  02/24/24            PATIENT WILL DEMONSTRATE INDEPENDENCE IN HOME PROGRAM FOR SUPPORT OF PROGRESSION       Start:  12/26/23    Expected End:  02/24/24            PATIENT WILL REPORT PAIN OF 0/10 DEMONSTRATING A REDUCTION OF OVERALL PAIN       Start:  12/26/23    Expected End:  02/02/24            PATIENT WILL DEMONSTRATE ABILITY TO FOLLOW biceps  PRECUATIONS INDEPENDENTLY       Start:  12/26/23    Expected End:  01/04/24              Resolved       OT Goals       OT Goal 1 (Not Progressing)       Start:  11/06/23    Expected End:  12/08/23    Resolved:  12/26/23    Pt will have full strength in R elbow and forearm to return to work duties            OT Problem       PATIENT WILL REPORT PAIN OF 0/10 DEMONSTRATING A REDUCTION OF OVERALL PAIN (Not Progressing)       Start:  11/06/23    Expected End:  12/08/23    Resolved:  12/26/23

## 2024-02-06 ENCOUNTER — APPOINTMENT (OUTPATIENT)
Dept: OCCUPATIONAL THERAPY | Facility: CLINIC | Age: 54
End: 2024-02-06
Payer: COMMERCIAL

## 2024-02-08 ENCOUNTER — TREATMENT (OUTPATIENT)
Dept: OCCUPATIONAL THERAPY | Facility: CLINIC | Age: 54
End: 2024-02-08
Payer: COMMERCIAL

## 2024-02-08 DIAGNOSIS — S46.211S BICEPS RUPTURE, DISTAL, RIGHT, SEQUELA: Primary | ICD-10-CM

## 2024-02-08 PROCEDURE — 97110 THERAPEUTIC EXERCISES: CPT | Mod: GO

## 2024-02-09 NOTE — PROGRESS NOTES
"          Occupational Therapy  Occupational Therapy Treatment    Name: Rodney Rendon \"W P\"  MRN: [id number]  : 1970  Date: 24  Time Calculation  Start Time: 1555  Stop Time: 1630  Time Calculation (min): 35 min  Visit# 6    Assessment:   Pt is tolerating exercises well.  Able to tolerate increased loads with no increase in pain      Plan:  Continue to progress strengthening as tolerated        Subjective My wrist is feeling a lot better.\"  General:        Pain Assessment:  1-2/10 R wrist        Objective    AROM R  elbow extension/flexion: 0/140    Modalities:     Communication:     Splinting:     Therapeutic Exercise  UBE x 6  B cable curl with 17 lbs x 15x3  PNF diagonals x 15x3 with 7 lbs   Rope pull biceps curl x 15x3 with 17 lbs  Triceps extensions x 15x3 with 17 lbs   Alternating biceps curl with 5 lbs x 15x3  Pro/Sup/wrist flex/ext with red flexbar x 10x3   Wrist twist x 6 x2 with 2 lbs      Manual Therapy           Therapy/Activity:   Strength:     Other Activity:           OP EDUCATION:        Goals:  Active       OT Problem       PATIENT IS ABLE TO OPEN ITEMS independently TO ACCESS MEAL ITEMS       Start:  23    Expected End:  24            PATIENT WILL ACHIEVE right ELBOW FLEXION STRENGTH OF 5/5       Start:  23    Expected End:  24            PATIENT WILL ACHIEVE right ELBOW EXTENSION STRENGTH OF 5/5       Start:  23    Expected End:  24            PATIENT WILL ACHIEVE right FOREARM SUPINATION STRENGTH OF 5/5       Start:  23    Expected End:  24            PATIENT WILL ACHIEVE right  STRENGTH  lbs IN THE two position       Start:  23    Expected End:  24            PATIENT WILL ACHIEVE right ELBOW ROM 0-140 DEGREES       Start:  23    Expected End:  24            PATIENT WILL SHOW A SIGNIFICANT CHANGE IN DASH PATIENT REPORTED OUTCOME TOOL TO DEMONSTRATE SUBJECTIVE IMPROVEMENT       Start:  23    " Expected End:  02/24/24            PATIENT WILL DEMONSTRATE INDEPENDENCE IN HOME PROGRAM FOR SUPPORT OF PROGRESSION       Start:  12/26/23    Expected End:  02/24/24            PATIENT WILL REPORT PAIN OF 0/10 DEMONSTRATING A REDUCTION OF OVERALL PAIN       Start:  12/26/23    Expected End:  02/02/24            PATIENT WILL DEMONSTRATE ABILITY TO FOLLOW biceps  PRECUATIONS INDEPENDENTLY       Start:  12/26/23    Expected End:  01/04/24              Resolved       OT Goals       OT Goal 1 (Not Progressing)       Start:  11/06/23    Expected End:  12/08/23    Resolved:  12/26/23    Pt will have full strength in R elbow and forearm to return to work duties            OT Problem       PATIENT WILL REPORT PAIN OF 0/10 DEMONSTRATING A REDUCTION OF OVERALL PAIN (Not Progressing)       Start:  11/06/23    Expected End:  12/08/23    Resolved:  12/26/23

## 2024-02-13 ENCOUNTER — APPOINTMENT (OUTPATIENT)
Dept: OCCUPATIONAL THERAPY | Facility: CLINIC | Age: 54
End: 2024-02-13
Payer: COMMERCIAL

## 2024-02-15 ENCOUNTER — TREATMENT (OUTPATIENT)
Dept: OCCUPATIONAL THERAPY | Facility: CLINIC | Age: 54
End: 2024-02-15
Payer: COMMERCIAL

## 2024-02-15 DIAGNOSIS — S46.211S STRAIN OF MUSCLE, FASCIA AND TENDON OF OTHER PARTS OF BICEPS, RIGHT ARM, SEQUELA: Primary | ICD-10-CM

## 2024-02-15 PROCEDURE — 97110 THERAPEUTIC EXERCISES: CPT | Mod: GO

## 2024-02-15 NOTE — PROGRESS NOTES
"          Occupational Therapy  Occupational Therapy Treatment    Name: Rodney Rendon \"W P\"  MRN: [id number]  : 1970  Date: 02/15/24  Time Calculation  Start Time: 350  Stop Time: 420  Time Calculation (min): 30 min  Visit# 7    Assessment:   Pt is tolerating exercises well.  Able to tolerate increased loads with no increase in pain      Plan:  Continue to progress strengthening as tolerated        Subjective My wrist isn't hurting like it was .\"  General:        Pain Assessment:  1-2/10 R wrist        Objective    AROM R  elbow extension/flexion: 0/140    Modalities:     Communication:     Splinting:     Therapeutic Exercise  UBE x 6  B cable curl with 17 lbs x 15x3  PNF diagonals x 15x3 with 7 lbs   Rope pull biceps curl x 15x3 with 17 lbs  Triceps extensions x 15x3 with 22 lbs   Bodyblade- biceps curl and scaption       Manual Therapy           Therapy/Activity:   Strength:     Other Activity:           OP EDUCATION:        Goals:  Active       OT Problem       PATIENT IS ABLE TO OPEN ITEMS independently TO ACCESS MEAL ITEMS       Start:  23    Expected End:  24            PATIENT WILL ACHIEVE right ELBOW FLEXION STRENGTH OF 5/5       Start:  23    Expected End:  24            PATIENT WILL ACHIEVE right ELBOW EXTENSION STRENGTH OF 5/5       Start:  23    Expected End:  24            PATIENT WILL ACHIEVE right FOREARM SUPINATION STRENGTH OF 5/5       Start:  23    Expected End:  24            PATIENT WILL ACHIEVE right  STRENGTH  lbs IN THE two position       Start:  23    Expected End:  24            PATIENT WILL ACHIEVE right ELBOW ROM 0-140 DEGREES       Start:  23    Expected End:  24            PATIENT WILL SHOW A SIGNIFICANT CHANGE IN DASH PATIENT REPORTED OUTCOME TOOL TO DEMONSTRATE SUBJECTIVE IMPROVEMENT       Start:  23    Expected End:  24            PATIENT WILL DEMONSTRATE INDEPENDENCE IN HOME PROGRAM " FOR SUPPORT OF PROGRESSION       Start:  12/26/23    Expected End:  02/24/24            PATIENT WILL REPORT PAIN OF 0/10 DEMONSTRATING A REDUCTION OF OVERALL PAIN       Start:  12/26/23    Expected End:  02/02/24            PATIENT WILL DEMONSTRATE ABILITY TO FOLLOW biceps  PRECUATIONS INDEPENDENTLY       Start:  12/26/23    Expected End:  01/04/24              Resolved       OT Goals       OT Goal 1 (Not Progressing)       Start:  11/06/23    Expected End:  12/08/23    Resolved:  12/26/23    Pt will have full strength in R elbow and forearm to return to work duties            OT Problem       PATIENT WILL REPORT PAIN OF 0/10 DEMONSTRATING A REDUCTION OF OVERALL PAIN (Not Progressing)       Start:  11/06/23    Expected End:  12/08/23    Resolved:  12/26/23

## 2024-02-20 ENCOUNTER — APPOINTMENT (OUTPATIENT)
Dept: OCCUPATIONAL THERAPY | Facility: CLINIC | Age: 54
End: 2024-02-20
Payer: COMMERCIAL

## 2024-02-22 ENCOUNTER — TREATMENT (OUTPATIENT)
Dept: OCCUPATIONAL THERAPY | Facility: CLINIC | Age: 54
End: 2024-02-22
Payer: COMMERCIAL

## 2024-02-22 DIAGNOSIS — S46.211S BICEPS RUPTURE, DISTAL, RIGHT, SEQUELA: Primary | ICD-10-CM

## 2024-02-22 PROCEDURE — 97110 THERAPEUTIC EXERCISES: CPT | Mod: GO

## 2024-02-23 NOTE — PROGRESS NOTES
"        Occupational Therapy  Occupational Therapy Treatment    Name: Rodney Rendon \"W P\"  MRN: [id number]  : 1970  Date: 24  Time Calculation  Start Time: 345  Stop Time: 420  Time Calculation (min): 35 min  Visit# 8    Assessment:   Pt is tolerating exercises well.  Able to tolerate increased loads with no increase in pain. Continues to demonstrate fatigue with strengthening R biceps     Plan:  Continue to progress strengthening as tolerated        Subjective     General:        Pain Assessment:  1-2/10 R wrist        Objective    AROM R  elbow extension/flexion: 0/140  Biceps strength R: 4/5    Modalities:     Communication:     Splinting:     Therapeutic Exercise  UBE x 6  B cable curl with 17 lbs x 15x3  PNF diagonals x 15x3 with 7 lbs   Rope pull biceps curl x 15x3 with 17 lbs  Triceps extensions x 15x3 with 30 lbs   Bodyblade- biceps curl and scaption  21's biceps curl with 15 lb bar  Single arm biceps curl with 5 lbs x 10x3        Manual Therapy           Therapy/Activity:   Strength:     Other Activity:           OP EDUCATION:        Goals:  Active       OT Problem       PATIENT IS ABLE TO OPEN ITEMS independently TO ACCESS MEAL ITEMS       Start:  23    Expected End:  24            PATIENT WILL ACHIEVE right ELBOW FLEXION STRENGTH OF 5/5       Start:  23    Expected End:  24            PATIENT WILL ACHIEVE right ELBOW EXTENSION STRENGTH OF 5/5       Start:  23    Expected End:  24            PATIENT WILL ACHIEVE right FOREARM SUPINATION STRENGTH OF 5/5       Start:  23    Expected End:  24            PATIENT WILL ACHIEVE right  STRENGTH  lbs IN THE two position       Start:  23    Expected End:  24            PATIENT WILL ACHIEVE right ELBOW ROM 0-140 DEGREES       Start:  23    Expected End:  24            PATIENT WILL SHOW A SIGNIFICANT CHANGE IN DASH PATIENT REPORTED OUTCOME TOOL TO DEMONSTRATE SUBJECTIVE " IMPROVEMENT       Start:  12/26/23    Expected End:  02/24/24            PATIENT WILL DEMONSTRATE INDEPENDENCE IN HOME PROGRAM FOR SUPPORT OF PROGRESSION       Start:  12/26/23    Expected End:  02/24/24            PATIENT WILL REPORT PAIN OF 0/10 DEMONSTRATING A REDUCTION OF OVERALL PAIN       Start:  12/26/23    Expected End:  02/02/24            PATIENT WILL DEMONSTRATE ABILITY TO FOLLOW biceps  PRECUATIONS INDEPENDENTLY       Start:  12/26/23    Expected End:  01/04/24              Resolved       OT Goals       OT Goal 1 (Not Progressing)       Start:  11/06/23    Expected End:  12/08/23    Resolved:  12/26/23    Pt will have full strength in R elbow and forearm to return to work duties            OT Problem       PATIENT WILL REPORT PAIN OF 0/10 DEMONSTRATING A REDUCTION OF OVERALL PAIN (Not Progressing)       Start:  11/06/23    Expected End:  12/08/23    Resolved:  12/26/23

## 2024-02-26 DIAGNOSIS — I10 BENIGN ESSENTIAL HYPERTENSION: ICD-10-CM

## 2024-02-26 RX ORDER — METOPROLOL SUCCINATE 25 MG/1
25 TABLET, EXTENDED RELEASE ORAL DAILY
Qty: 90 TABLET | Refills: 0 | Status: SHIPPED | OUTPATIENT
Start: 2024-02-26

## 2024-02-27 ENCOUNTER — APPOINTMENT (OUTPATIENT)
Dept: OCCUPATIONAL THERAPY | Facility: CLINIC | Age: 54
End: 2024-02-27
Payer: COMMERCIAL

## 2024-03-05 ENCOUNTER — APPOINTMENT (OUTPATIENT)
Dept: OCCUPATIONAL THERAPY | Facility: CLINIC | Age: 54
End: 2024-03-05
Payer: COMMERCIAL

## 2024-03-13 ENCOUNTER — TREATMENT (OUTPATIENT)
Dept: OCCUPATIONAL THERAPY | Facility: CLINIC | Age: 54
End: 2024-03-13
Payer: COMMERCIAL

## 2024-03-13 DIAGNOSIS — S46.211S BICEPS RUPTURE, DISTAL, RIGHT, SEQUELA: Primary | ICD-10-CM

## 2024-03-13 PROCEDURE — 97110 THERAPEUTIC EXERCISES: CPT | Mod: GO

## 2024-03-13 NOTE — PROGRESS NOTES
"        Occupational Therapy  Occupational Therapy Treatment    Name: Rodney Rendon \"W P\"  MRN: [id number]  : 1970  Date: 24  Time Calculation  Start Time: 1645  Stop Time: 1720  Time Calculation (min): 35 min  Visit# 9    Assessment:   Pt is tolerating exercises well.  Able to tolerate increased loads with no increase in pain. Strength improved to 4+/5 R biceps      Plan:  Continue to progress strengthening as tolerated        Subjective   \"My wrist hasn't hurt at all\"  General:        Pain Assessment:  0/10 R wrist        Objective    AROM R  elbow extension/flexion: 0/140  Biceps strength R: 4+/5    Modalities:     Communication:     Splinting:     Therapeutic Exercise  UBE x 6  B cable curl with 17 lbs x 15x3  PNF diagonals x 15x3 with 7 lbs   Rope pull biceps curl x 15x3 with 17 lbs  Triceps extensions x 15x3 with 30 lbs   Bodyblade- biceps curl and scaption  21's biceps curl with 15 lb bar  Single arm biceps curl with 5 lbs x 10x3   Pro/sup holding 5 lb wt x 15x3        Manual Therapy           Therapy/Activity:   Strength:     Other Activity:           OP EDUCATION:        Goals:            "

## 2024-03-19 ENCOUNTER — TREATMENT (OUTPATIENT)
Dept: OCCUPATIONAL THERAPY | Facility: CLINIC | Age: 54
End: 2024-03-19
Payer: COMMERCIAL

## 2024-03-19 DIAGNOSIS — S46.211S BICEPS RUPTURE, DISTAL, RIGHT, SEQUELA: Primary | ICD-10-CM

## 2024-03-19 PROCEDURE — 97110 THERAPEUTIC EXERCISES: CPT | Mod: GO

## 2024-03-19 NOTE — PROGRESS NOTES
"Discharge Summary    Name: Rodney Rendon \"W P\"  MRN: [id number]  : 1970  Date: 24    Discharge Summary: OT    Discharge Information: Date of discharge 3/19/2024, Date of last visit 3/19/2024, Date of evaluation 23, Number of attended visits 10, Referred by Dr. Nickesron, and Referred for R distal biceps repair    Therapy Summary: Pt has full AROM and strength R UE and has met goals        Rehab Discharge Reason: Achieved all and/or the most significant goals(s)        "

## 2024-03-19 NOTE — PROGRESS NOTES
"        Occupational Therapy  Occupational Therapy Treatment    Name: Rodney Rendon \"W P\"  MRN: [id number]  : 1970  Date: 24  Time Calculation  Start Time: 1540  Stop Time: 1615  Time Calculation (min): 35 min  Visit# 10    Assessment:   Pt is tolerating exercises well.  Able to tolerate increased loads with no increase in pain. Strength improved to 5/5 R biceps. Pt has achieved goals      Plan:  Discharge OT        Subjective   \"It's been feeling pretty good, the area that was numb is coming back\"  General:        Pain Assessment:  0/10 R wrist        Objective    AROM R  elbow extension/flexion: 0/140  Biceps strength R: 5/5    Modalities:     Communication:     Splinting:     Therapeutic Exercise  UBE x 6  B cable curl with 17 lbs x 15x3  PNF diagonals x 15x3 with 7 lbs   Matrix biceps curl B x 15x3 with 27 lbs  Triceps extensions x 15x3 with 30 lbs   21's biceps curl with 15 lb bar  Single arm biceps curl with 12.5 lbs x 10x3   Pro/sup holding 5 lb wt x 15x3        Manual Therapy           Therapy/Activity:   Strength:     Other Activity:           OP EDUCATION:        Goals:          "

## 2024-03-27 ENCOUNTER — APPOINTMENT (OUTPATIENT)
Dept: OCCUPATIONAL THERAPY | Facility: CLINIC | Age: 54
End: 2024-03-27
Payer: COMMERCIAL

## 2024-07-02 ENCOUNTER — OFFICE VISIT (OUTPATIENT)
Dept: ORTHOPEDIC SURGERY | Facility: CLINIC | Age: 54
End: 2024-07-02
Payer: COMMERCIAL

## 2024-07-02 VITALS — WEIGHT: 255 LBS | HEIGHT: 70 IN | BODY MASS INDEX: 36.51 KG/M2

## 2024-07-02 DIAGNOSIS — S46.211S BICEPS RUPTURE, DISTAL, RIGHT, SEQUELA: Primary | ICD-10-CM

## 2024-07-02 PROCEDURE — 99213 OFFICE O/P EST LOW 20 MIN: CPT | Performed by: STUDENT IN AN ORGANIZED HEALTH CARE EDUCATION/TRAINING PROGRAM

## 2024-07-02 PROCEDURE — 1036F TOBACCO NON-USER: CPT | Performed by: STUDENT IN AN ORGANIZED HEALTH CARE EDUCATION/TRAINING PROGRAM

## 2024-07-02 ASSESSMENT — PAIN - FUNCTIONAL ASSESSMENT: PAIN_FUNCTIONAL_ASSESSMENT: NO/DENIES PAIN

## 2024-07-02 NOTE — PROGRESS NOTES
6 months out doing great has no issues back to full activities lifting without problems.  Numbness is gone away.    Review of Systems  A complete review of systems was conducted, pertinent only to the HPI noted above.  Constitutional: None  Eyes: No additions to above history  Ears, Nose, Throat: No additions to above history  Cardiovascular: No additions to above history  Respiratory: No additions to above history  GI: No additions to above history  : No additions to above history  Skin/Neuro: No additions to above history  Endocrine/Heme/Lymph: No additions to above history  Immunologic: No additions to above history  Psychiatric: No additions to above history  Musculoskeletal: see above    Physical Exam  GEN: Alert and Oriented x 3  Constitutional: Well appearing, in no apparent distress.        Focused Musculoskeletal Exam:     RIGHT   elbow:   incision healed negative hook sign biceps palpably intact, resolved LABC numbness full elbow extension and flexion symmetric pronosupination    Sensation intact Ax/median/ulnar/radial distributions  Motor intact Ax/median/radial/ulnar/AIN/PIN    He has done very well he can progress his activities as tolerated follow-up as needed.  All questions answered, patient in agreement with the plan.

## 2025-02-26 ENCOUNTER — OFFICE VISIT (OUTPATIENT)
Dept: PRIMARY CARE | Facility: CLINIC | Age: 55
End: 2025-02-26
Payer: COMMERCIAL

## 2025-02-26 VITALS
BODY MASS INDEX: 36.3 KG/M2 | OXYGEN SATURATION: 97 % | HEART RATE: 96 BPM | TEMPERATURE: 97.1 F | WEIGHT: 253 LBS | SYSTOLIC BLOOD PRESSURE: 124 MMHG | DIASTOLIC BLOOD PRESSURE: 70 MMHG

## 2025-02-26 DIAGNOSIS — I10 BENIGN ESSENTIAL HYPERTENSION: ICD-10-CM

## 2025-02-26 DIAGNOSIS — R50.9 FEVER, UNSPECIFIED FEVER CAUSE: Primary | ICD-10-CM

## 2025-02-26 LAB
POC BINAX EXPIRATION: NORMAL
POC BINAX NOW COVID SERIAL NUMBER: NORMAL
POC SARS-COV-2 AG BINAX: NORMAL

## 2025-02-26 PROCEDURE — 3078F DIAST BP <80 MM HG: CPT

## 2025-02-26 PROCEDURE — 87811 SARS-COV-2 COVID19 W/OPTIC: CPT

## 2025-02-26 PROCEDURE — 99213 OFFICE O/P EST LOW 20 MIN: CPT

## 2025-02-26 PROCEDURE — 3074F SYST BP LT 130 MM HG: CPT

## 2025-02-26 RX ORDER — OSELTAMIVIR PHOSPHATE 75 MG/1
75 CAPSULE ORAL 2 TIMES DAILY
Qty: 10 CAPSULE | Refills: 0 | Status: SHIPPED | OUTPATIENT
Start: 2025-02-26 | End: 2025-03-03

## 2025-02-26 RX ORDER — METOPROLOL SUCCINATE 25 MG/1
25 TABLET, EXTENDED RELEASE ORAL DAILY
Qty: 90 TABLET | Refills: 0 | Status: SHIPPED | OUTPATIENT
Start: 2025-02-26

## 2025-02-26 ASSESSMENT — PATIENT HEALTH QUESTIONNAIRE - PHQ9
2. FEELING DOWN, DEPRESSED OR HOPELESS: NOT AT ALL
1. LITTLE INTEREST OR PLEASURE IN DOING THINGS: NOT AT ALL
SUM OF ALL RESPONSES TO PHQ9 QUESTIONS 1 AND 2: 0

## 2025-02-26 NOTE — ASSESSMENT & PLAN NOTE
BP did come down in the office  Recommend he monitor it and restart metoprolol if needed  Also recommend he schedule a check up appointment with Dr. Gar   Orders:    metoprolol succinate XL (Toprol-XL) 25 mg 24 hr tablet; Take 1 tablet (25 mg) by mouth once daily. Before bed

## 2025-02-26 NOTE — LETTER
February 26, 2025     Patient: Rodney Rendon   YOB: 1970   Date of Visit: 2/26/2025       To Whom It May Concern:    Rodney Rendon was seen in my clinic on 2/26/2025. Please excuse Rodney for his absence from work on this day to make the appointment. Please excuse him from work 2/27/2025. He can return to work 2/28/2025.     If you have any questions or concerns, please don't hesitate to call.         Sincerely,         Katia Marcelino PA-C

## 2025-02-26 NOTE — PROGRESS NOTES
"Subjective   Patient ID: Rodney Rendon \"LUZ ABREU\" is a 54 y.o. male who presents for Fever (Sick since yesterday), Shortness of Breath, vomiting and diarrhea, and Headache (Slight cough).  HPI  Rodney presents for not feeling well   -Wife has been sick, a friend of hers has influenza A   -Rodney states he does not get sick often and feels very sick now  -Temperature of 102.1, did not take anything this morning   -It was 101.9, low as 96.3F  -Has chills, body aches  -Nauseous, vomited 3 times between today and yesterday   -Diarrhea started yesterday, still going on   -Headache as well   -Cough a little yesterday and little today   -No runny nose  -A little sore throat   -Feels like something is beating on him   -Nothing otc  -Weak, not feeling good     Past Surgical History:   Procedure Laterality Date    HIP ARTHROPLASTY        Past Medical History:   Diagnosis Date    Hypertension      Social History     Tobacco Use    Smoking status: Former     Types: Cigarettes    Smokeless tobacco: Current     Types: Chew   Vaping Use    Vaping status: Never Used   Substance Use Topics    Alcohol use: Yes     Comment: social    Drug use: Never        Review of Systems  10 point review of systems performed and is negative except as noted in the HPI.      Current Outpatient Medications:     escitalopram (Lexapro) 10 mg tablet, Take 1/2 a day for 7 days then one a day (Patient not taking: Reported on 2/26/2025), Disp: 30 tablet, Rfl: 1    metoprolol succinate XL (Toprol-XL) 25 mg 24 hr tablet, Take 1 tablet (25 mg) by mouth once daily. Before bed, Disp: 90 tablet, Rfl: 0    oseltamivir (Tamiflu) 75 mg capsule, Take 1 capsule (75 mg) by mouth 2 times a day for 5 days., Disp: 10 capsule, Rfl: 0    Current Facility-Administered Medications:     methylPREDNISolone acetate (DEPO-Medrol) injection 40 mg, 40 mg, intralesional, Once, Alis Santacruz MD     Objective   /70   Pulse 96   Temp 36.2 °C (97.1 °F)   Wt 115 kg (253 lb)   " SpO2 97%   BMI 36.30 kg/m²     Physical Exam  Vitals reviewed.   Constitutional:       General: He is not in acute distress.     Appearance: Normal appearance. He is ill-appearing. He is not toxic-appearing.   HENT:      Head: Normocephalic and atraumatic.      Right Ear: Tympanic membrane, ear canal and external ear normal.      Left Ear: Tympanic membrane, ear canal and external ear normal.      Nose: No congestion or rhinorrhea.      Right Sinus: No maxillary sinus tenderness or frontal sinus tenderness.      Left Sinus: No maxillary sinus tenderness or frontal sinus tenderness.      Mouth/Throat:      Mouth: Mucous membranes are moist.      Pharynx: Oropharynx is clear. Uvula midline. Posterior oropharyngeal erythema present. No oropharyngeal exudate.   Eyes:      Conjunctiva/sclera: Conjunctivae normal.      Pupils: Pupils are equal, round, and reactive to light.   Cardiovascular:      Rate and Rhythm: Normal rate and regular rhythm.      Heart sounds: Normal heart sounds. No murmur heard.  Pulmonary:      Effort: Pulmonary effort is normal.      Breath sounds: Normal breath sounds. No wheezing, rhonchi or rales.   Lymphadenopathy:      Cervical: No cervical adenopathy.   Skin:     General: Skin is warm and dry.   Neurological:      Mental Status: He is alert.         Assessment & Plan  Fever, unspecified fever cause  Rapid COVID negative  Discussed it sounds more like the flu, especially influenza A given exposure, we are out of rapid flu tests in the office so will do the send out pcr  Discussed either waiting for the result or starting tamiflu now given exposure, discussed risks of tamiflu, he would like to start today   Stressed importance of increasing fluids, rest  Orders:    POCT BinaxNOW Covid-19 Ag Card manually resulted    Influenza A, and B PCR    Sars-CoV-2 and Influenza A/B PCR    oseltamivir (Tamiflu) 75 mg capsule; Take 1 capsule (75 mg) by mouth 2 times a day for 5 days.    Benign essential  hypertension  BP did come down in the office  Recommend he monitor it and restart metoprolol if needed  Also recommend he schedule a check up appointment with Dr. Gar   Orders:    metoprolol succinate XL (Toprol-XL) 25 mg 24 hr tablet; Take 1 tablet (25 mg) by mouth once daily. Before bed          Discussed at visit any disease processes that were of concern as well as the risks, benefits and instructions on any new medication provided. Patient (and/or caretaker of patient if present) stated all questions were answered, and they voiced understanding of instructions.      Katia Marcelino PA-C

## 2025-02-27 LAB
FLUAV RNA RESP QL NAA+PROBE: NOT DETECTED
FLUAV RNA SPEC QL NAA+PROBE: NORMAL
FLUBV RNA RESP QL NAA+PROBE: NOT DETECTED
FLUBV RNA SPEC QL NAA+PROBE: NORMAL
SARS-COV-2 RNA RESP QL NAA+PROBE: NOT DETECTED
SPECIMEN SOURCE: NORMAL

## 2025-03-04 LAB
FLUAV RNA RESP QL NAA+PROBE: NOT DETECTED
FLUAV RNA SPEC QL NAA+PROBE: NOT DETECTED
FLUBV RNA RESP QL NAA+PROBE: NOT DETECTED
FLUBV RNA SPEC QL NAA+PROBE: NOT DETECTED
SARS-COV-2 RNA RESP QL NAA+PROBE: NOT DETECTED
SPECIMEN SOURCE: NORMAL

## (undated) DEVICE — CUFF, TOURNIQUET 18 DUAL PORT/SNGL BLAD"

## (undated) DEVICE — STOCKINETTE, IMPERVIOUS, 12 X 48 IN, LF, STERILE

## (undated) DEVICE — Device

## (undated) DEVICE — BANDAGE, COFLEX, 4 X 5 YDS, TAN, STERILE, LF

## (undated) DEVICE — COUNTER, NEEDLE, FOAM BLOCK, W/MAGNET, W/BLADE GUARD, 10 COUNT, RED, LF

## (undated) DEVICE — SYSTEM, IMPLANT DELIVERY, BIOCOMPOSITE, DISTAL BICEPS REPAIR

## (undated) DEVICE — CORD, CAUTERY, BIOPOLAR FORCEP, 12FT

## (undated) DEVICE — TOWEL, SURGICAL, NEURO, O/R, 16 X 26, BLUE, STERILE

## (undated) DEVICE — DRAPE, SHEET, THREE QUARTER, FAN FOLD, 57 X 77 IN

## (undated) DEVICE — DRAPE MINI, C-ARM, 54 X 64 IN

## (undated) DEVICE — SPONGE, LAP, XRAY DECT, 18IN X 18IN, W/MASTER DMT, STERILE

## (undated) DEVICE — BANDAGE, ESMARK, 3 IN X 9 FT, LF